# Patient Record
Sex: MALE | URBAN - METROPOLITAN AREA
[De-identification: names, ages, dates, MRNs, and addresses within clinical notes are randomized per-mention and may not be internally consistent; named-entity substitution may affect disease eponyms.]

---

## 2018-08-21 ENCOUNTER — APPOINTMENT (RX ONLY)
Dept: URBAN - METROPOLITAN AREA CLINIC 77 | Facility: CLINIC | Age: 23
Setting detail: DERMATOLOGY
End: 2018-08-21

## 2018-08-21 DIAGNOSIS — B36.0 PITYRIASIS VERSICOLOR: ICD-10-CM

## 2018-08-21 DIAGNOSIS — L70.0 ACNE VULGARIS: ICD-10-CM

## 2018-08-21 DIAGNOSIS — L70.2 ACNE VARIOLIFORMIS: ICD-10-CM

## 2018-08-21 DIAGNOSIS — A63.0 ANOGENITAL (VENEREAL) WARTS: ICD-10-CM

## 2018-08-21 PROCEDURE — ? TREATMENT REGIMEN

## 2018-08-21 PROCEDURE — 17110 DESTRUCTION B9 LES UP TO 14: CPT

## 2018-08-21 PROCEDURE — ? COUNSELING

## 2018-08-21 PROCEDURE — ? PRESCRIPTION

## 2018-08-21 PROCEDURE — ? LIQUID NITROGEN

## 2018-08-21 PROCEDURE — 99203 OFFICE O/P NEW LOW 30 MIN: CPT | Mod: 25

## 2018-08-21 RX ORDER — KETOCONAZOLE 20.5 MG/ML
SHAMPOO, SUSPENSION TOPICAL BIW
Qty: 1 | Refills: 3 | Status: ERX | COMMUNITY
Start: 2018-08-21

## 2018-08-21 RX ORDER — TAZAROTENE 1 MG/G
AEROSOL, FOAM TOPICAL
Qty: 1 | Refills: 2 | Status: ERX | COMMUNITY
Start: 2018-08-21

## 2018-08-21 RX ORDER — MINOCYCLINE HYDROCHLORIDE 105 MG/1
TABLET, FILM COATED, EXTENDED RELEASE ORAL
Qty: 30 | Refills: 2 | Status: ERX | COMMUNITY
Start: 2018-08-21

## 2018-08-21 RX ORDER — SULFACETAMIDE SODIUM, SULFUR 100; 20 MG/G; MG/G
EMULSION TOPICAL
Qty: 1 | Refills: 2 | Status: ERX | COMMUNITY
Start: 2018-08-21

## 2018-08-21 RX ORDER — CLOBETASOL PROPIONATE 0.05 G/100ML
SHAMPOO TOPICAL
Qty: 1 | Refills: 2 | Status: ERX | COMMUNITY
Start: 2018-08-21

## 2018-08-21 RX ADMIN — CLOBETASOL PROPIONATE: 0.05 SHAMPOO TOPICAL at 00:00

## 2018-08-21 RX ADMIN — TAZAROTENE: 1 AEROSOL, FOAM TOPICAL at 00:00

## 2018-08-21 RX ADMIN — MINOCYCLINE HYDROCHLORIDE: 105 TABLET, FILM COATED, EXTENDED RELEASE ORAL at 00:00

## 2018-08-21 RX ADMIN — SULFACETAMIDE SODIUM, SULFUR: 100; 20 EMULSION TOPICAL at 00:00

## 2018-08-21 RX ADMIN — KETOCONAZOLE: 20.5 SHAMPOO, SUSPENSION TOPICAL at 00:00

## 2018-08-21 ASSESSMENT — LOCATION SIMPLE DESCRIPTION DERM
LOCATION SIMPLE: LEFT CHEEK
LOCATION SIMPLE: POSTERIOR SCALP
LOCATION SIMPLE: SCALP
LOCATION SIMPLE: UPPER BACK
LOCATION SIMPLE: NOSE
LOCATION SIMPLE: INFERIOR FOREHEAD
LOCATION SIMPLE: RIGHT CHEEK
LOCATION SIMPLE: PENIS
LOCATION SIMPLE: CHIN
LOCATION SIMPLE: CHEST

## 2018-08-21 ASSESSMENT — LOCATION DETAILED DESCRIPTION DERM
LOCATION DETAILED: INFERIOR MID FOREHEAD
LOCATION DETAILED: RIGHT DORSAL SHAFT OF PENIS
LOCATION DETAILED: LEFT DORSAL SHAFT OF PENIS
LOCATION DETAILED: RIGHT CENTRAL MALAR CHEEK
LOCATION DETAILED: LEFT CENTRAL MALAR CHEEK
LOCATION DETAILED: RIGHT INFERIOR CENTRAL MALAR CHEEK
LOCATION DETAILED: NASAL DORSUM
LOCATION DETAILED: SUPERIOR THORACIC SPINE
LOCATION DETAILED: POSTERIOR MID-PARIETAL SCALP
LOCATION DETAILED: RIGHT CHIN
LOCATION DETAILED: LEFT INFERIOR CENTRAL MALAR CHEEK
LOCATION DETAILED: LEFT SUPERIOR PARIETAL SCALP
LOCATION DETAILED: MIDDLE STERNUM

## 2018-08-21 ASSESSMENT — LOCATION ZONE DERM
LOCATION ZONE: FACE
LOCATION ZONE: SCALP
LOCATION ZONE: NOSE
LOCATION ZONE: TRUNK
LOCATION ZONE: PENIS

## 2018-08-21 NOTE — PROCEDURE: TREATMENT REGIMEN
Plan: Location: face\\nPrescribe: Fabior 0.1% foam (AAA qhs)\\More LS 10%-2% topical cleanser-- Apply in the morning and night to the face when it is clean and dry. Let medication sit for 3-5 minutes before rinsing off.\\n\\nPatient presents with occasional acne breakouts that include comedones and inflamed papules\\Candy the past, he’s used Epiduo every other day without any issues\\nPatient states that when using the Epiduo, the benzoyl peroxide component would bleach his hair \\nDiscussed with patient that this is an immune mediated condition triggered with stress, lack of sleep, and also has a major genetic component\\nIf the patient tries and fails the given treatment plan, can start pt on Accutane at next visit\\nEducated patient on Accutane 6 month treatment course, side effects, and iPledge program/requirements\\nCommon side effects from therapy: dryness, joint pain, mood changes, headaches, nose bleeds\\nDiscussed with patient that Accutane is a Vitamin A derivative\\nDiscussed with patient that Accutane obliterates oil glands and a cure for acne vs. antibiotics which is a temporary treatment\\nDiscussed with patient that medication is processed by the liver and requires blood work to monitor liver functions\\n\\nInstead of using the Epiduo topical, will switch him to Fabior foam every other night and Avar LS 10%-2% topical cleanser (3-5 minutes) to help relieve inflammation.
Detail Level: Zone
Plan: Location: scalp\\nPrescribe: Solodyn 105mg P.O. qd x 30 days prn, Clobetasol 0.05% shampoo\\nPatient presents with inflamed papules and mild scaling on the scalp that he finds irritating and bothersome\\nDiscussed with him that this is a form of acne called acne necrotica\\nEducated him that it is an inflammatory condition, triggered with stress, lack of sleep, and also has a genetic component\\nWill have him start taking Solodyn whenever his acne flares up\\Candy addition, will have him use Clobetasol shampoo to alleviate inflammation
Plan: Location: Chest, back\\nPrescribe: Ketaconazole 2% shampoo (AAA) qd x 30 days\\n\\nDiscussed with pt that white scaly hyperpigmented patches are from an overcolonization of yeast that naturally lives on our body. \\nWill start pt on Ketoconazole oral medication to take for 3 days, advise to exercise one hour afterwards to sweat out infection.\\nWE DISCUSSED THAT THIS IS A CHRONIC ISSUE---WILL TEND TO HAPPEN IN THE FUTURE WHEN  PT IS STRESSED OUT AND HAS A LOT SWEAT.\\nTo prevent this we will give pt Ketaconazole shampoo to use weekly as a prophylactic to prevent recurrence. \\nF/u as needed
Plan: Location: genital region \\nTreatment: LN2-2x\\n\\nPatient has a history of genital warts and states he had the HPV vaccination before\\nDiscussed with patient that they are a common HPV viral infection that occurs during stress and immune system is unable to fight the virus.\\nWill treat with cryotherapy today to allow immune system to fight off virus. \\nDiscussed with pt if wart blister ups, then can use sterile needle to poke and let fluid drain.\\nDiscussed with pt that they should leave skin attached to help provide a protective barrier while it is healing.\\nDiscussed with patient that if this continues to be a recurring issue, will have him consider Gardasil vaccination

## 2018-08-21 NOTE — PROCEDURE: COUNSELING
Detail Level: Zone
Topical Retinoid Pregnancy And Lactation Text: This medication is Pregnancy Category C. It is unknown if this medication is excreted in breast milk.
High Dose Vitamin A Pregnancy And Lactation Text: High dose vitamin A therapy is contraindicated during pregnancy and breast feeding.
Birth Control Pills Pregnancy And Lactation Text: This medication should be avoided if pregnant and for the first 30 days post-partum.
Minocycline Counseling: Patient advised regarding possible photosensitivity and discoloration of the teeth, skin, lips, tongue and gums.  Patient instructed to avoid sunlight, if possible.  When exposed to sunlight, patients should wear protective clothing, sunglasses, and sunscreen.  The patient was instructed to call the office immediately if the following severe adverse effects occur:  hearing changes, easy bruising/bleeding, severe headache, or vision changes.  The patient verbalized understanding of the proper use and possible adverse effects of minocycline.  All of the patient's questions and concerns were addressed.
Dapsone Counseling: I discussed with the patient the risks of dapsone including but not limited to hemolytic anemia, agranulocytosis, rashes, methemoglobinemia, kidney failure, peripheral neuropathy, headaches, GI upset, and liver toxicity.  Patients who start dapsone require monitoring including baseline LFTs and weekly CBCs for the first month, then every month thereafter.  The patient verbalized understanding of the proper use and possible adverse effects of dapsone.  All of the patient's questions and concerns were addressed.
Spironolactone Counseling: Patient advised regarding risks of diarrhea, abdominal pain, hyperkalemia, birth defects (for female patients), liver toxicity and renal toxicity. The patient may need blood work to monitor liver and kidney function and potassium levels while on therapy. The patient verbalized understanding of the proper use and possible adverse effects of spironolactone.  All of the patient's questions and concerns were addressed.
Erythromycin Counseling:  I discussed with the patient the risks of erythromycin including but not limited to GI upset, allergic reaction, drug rash, diarrhea, increase in liver enzymes, and yeast infections.
Bactrim Pregnancy And Lactation Text: This medication is Pregnancy Category D and is known to cause fetal risk.  It is also excreted in breast milk.
Azithromycin Pregnancy And Lactation Text: This medication is considered safe during pregnancy and is also secreted in breast milk.
Benzoyl Peroxide Counseling: Patient counseled that medicine may cause skin irritation and bleach clothing.  In the event of skin irritation, the patient was advised to reduce the amount of the drug applied or use it less frequently.   The patient verbalized understanding of the proper use and possible adverse effects of benzoyl peroxide.  All of the patient's questions and concerns were addressed.
Dapsone Pregnancy And Lactation Text: This medication is Pregnancy Category C and is not considered safe during pregnancy or breast feeding.
Minocycline Pregnancy And Lactation Text: This medication is Pregnancy Category D and not consider safe during pregnancy. It is also excreted in breast milk.
Azithromycin Counseling:  I discussed with the patient the risks of azithromycin including but not limited to GI upset, allergic reaction, drug rash, diarrhea, and yeast infections.
Topical Retinoid counseling:  Patient advised to apply a pea-sized amount only at bedtime and wait 30 minutes after washing their face before applying.  If too drying, patient may add a non-comedogenic moisturizer. The patient verbalized understanding of the proper use and possible adverse effects of retinoids.  All of the patient's questions and concerns were addressed.
High Dose Vitamin A Counseling: Side effects reviewed, pt to contact office should one occur.
Isotretinoin Pregnancy And Lactation Text: This medication is Pregnancy Category X and is considered extremely dangerous during pregnancy. It is unknown if it is excreted in breast milk.
Birth Control Pills Counseling: Birth Control Pill Counseling: I discussed with the patient the potential side effects of OCPs including but not limited to increased risk of stroke, heart attack, thrombophlebitis, deep venous thrombosis, hepatic adenomas, breast changes, GI upset, headaches, and depression.  The patient verbalized understanding of the proper use and possible adverse effects of OCPs. All of the patient's questions and concerns were addressed.
Topical Clindamycin Pregnancy And Lactation Text: This medication is Pregnancy Category B and is considered safe during pregnancy. It is unknown if it is excreted in breast milk.
Tetracycline Counseling: Patient counseled regarding possible photosensitivity and increased risk for sunburn.  Patient instructed to avoid sunlight, if possible.  When exposed to sunlight, patients should wear protective clothing, sunglasses, and sunscreen.  The patient was instructed to call the office immediately if the following severe adverse effects occur:  hearing changes, easy bruising/bleeding, severe headache, or vision changes.  The patient verbalized understanding of the proper use and possible adverse effects of tetracycline.  All of the patient's questions and concerns were addressed. Patient understands to avoid pregnancy while on therapy due to potential birth defects.
Doxycycline Counseling:  Patient counseled regarding possible photosensitivity and increased risk for sunburn.  Patient instructed to avoid sunlight, if possible.  When exposed to sunlight, patients should wear protective clothing, sunglasses, and sunscreen.  The patient was instructed to call the office immediately if the following severe adverse effects occur:  hearing changes, easy bruising/bleeding, severe headache, or vision changes.  The patient verbalized understanding of the proper use and possible adverse effects of doxycycline.  All of the patient's questions and concerns were addressed.
Topical Clindamycin Counseling: Patient counseled that this medication may cause skin irritation or allergic reactions.  In the event of skin irritation, the patient was advised to reduce the amount of the drug applied or use it less frequently.   The patient verbalized understanding of the proper use and possible adverse effects of clindamycin.  All of the patient's questions and concerns were addressed.
Isotretinoin Counseling: Patient should get monthly blood tests, not donate blood, not drive at night if vision affected, not share medication, and not undergo elective surgery for 6 months after tx completed. Side effects reviewed, pt to contact office should one occur.
Benzoyl Peroxide Pregnancy And Lactation Text: This medication is Pregnancy Category C. It is unknown if benzoyl peroxide is excreted in breast milk.
Tazorac Pregnancy And Lactation Text: This medication is not safe during pregnancy. It is unknown if this medication is excreted in breast milk.
Erythromycin Pregnancy And Lactation Text: This medication is Pregnancy Category B and is considered safe during pregnancy. It is also excreted in breast milk.
Doxycycline Pregnancy And Lactation Text: This medication is Pregnancy Category D and not consider safe during pregnancy. It is also excreted in breast milk but is considered safe for shorter treatment courses.
Topical Sulfur Applications Pregnancy And Lactation Text: This medication is Pregnancy Category C and has an unknown safety profile during pregnancy. It is unknown if this topical medication is excreted in breast milk.
Topical Sulfur Applications Counseling: Topical Sulfur Counseling: Patient counseled that this medication may cause skin irritation or allergic reactions.  In the event of skin irritation, the patient was advised to reduce the amount of the drug applied or use it less frequently.   The patient verbalized understanding of the proper use and possible adverse effects of topical sulfur application.  All of the patient's questions and concerns were addressed.
Bactrim Counseling:  I discussed with the patient the risks of sulfa antibiotics including but not limited to GI upset, allergic reaction, drug rash, diarrhea, dizziness, photosensitivity, and yeast infections.  Rarely, more serious reactions can occur including but not limited to aplastic anemia, agranulocytosis, methemoglobinemia, blood dyscrasias, liver or kidney failure, lung infiltrates or desquamative/blistering drug rashes.
Tazorac Counseling:  Patient advised that medication is irritating and drying.  Patient may need to apply sparingly and wash off after an hour before eventually leaving it on overnight.  The patient verbalized understanding of the proper use and possible adverse effects of tazorac.  All of the patient's questions and concerns were addressed.
Spironolactone Pregnancy And Lactation Text: This medication can cause feminization of the male fetus and should be avoided during pregnancy. The active metabolite is also found in breast milk.

## 2018-08-21 NOTE — PROCEDURE: LIQUID NITROGEN
Render Post-Care Instructions In Note?: no
Consent: The patient's consent was obtained including but not limited to risks of crusting, scabbing, blistering, scarring, darker or lighter pigmentary change, recurrence, incomplete removal and infection.
Detail Level: Zone
Number Of Freeze-Thaw Cycles: 3 freeze-thaw cycles
Medical Necessity Clause: This procedure was medically necessary because the lesions that were treated were:
Medical Necessity Information: It is in your best interest to select a reason for this procedure from the list below. All of these items fulfill various CMS LCD requirements except the new and changing color options.
Post-Care Instructions: I reviewed with the patient in detail post-care instructions. Patient is to wear sunprotection, and avoid picking at any of the treated lesions. Pt may apply Vaseline to crusted or scabbing areas.

## 2018-10-08 ENCOUNTER — APPOINTMENT (RX ONLY)
Dept: URBAN - METROPOLITAN AREA CLINIC 77 | Facility: CLINIC | Age: 23
Setting detail: DERMATOLOGY
End: 2018-10-08

## 2018-10-08 DIAGNOSIS — B36.0 PITYRIASIS VERSICOLOR: ICD-10-CM

## 2018-10-08 DIAGNOSIS — L70.2 ACNE VARIOLIFORMIS: ICD-10-CM

## 2018-10-08 DIAGNOSIS — L70.0 ACNE VULGARIS: ICD-10-CM

## 2018-10-08 DIAGNOSIS — A63.0 ANOGENITAL (VENEREAL) WARTS: ICD-10-CM

## 2018-10-08 PROCEDURE — ? LIQUID NITROGEN

## 2018-10-08 PROCEDURE — 99213 OFFICE O/P EST LOW 20 MIN: CPT | Mod: 25

## 2018-10-08 PROCEDURE — ? TREATMENT REGIMEN

## 2018-10-08 PROCEDURE — 17110 DESTRUCTION B9 LES UP TO 14: CPT

## 2018-10-08 PROCEDURE — ? COUNSELING

## 2018-10-08 ASSESSMENT — LOCATION ZONE DERM
LOCATION ZONE: FACE
LOCATION ZONE: PENIS
LOCATION ZONE: TRUNK
LOCATION ZONE: NOSE
LOCATION ZONE: SCALP

## 2018-10-08 ASSESSMENT — LOCATION DETAILED DESCRIPTION DERM
LOCATION DETAILED: RIGHT CHIN
LOCATION DETAILED: RIGHT DORSAL SHAFT OF PENIS
LOCATION DETAILED: MIDDLE STERNUM
LOCATION DETAILED: RIGHT CENTRAL MALAR CHEEK
LOCATION DETAILED: SUPERIOR THORACIC SPINE
LOCATION DETAILED: RIGHT INFERIOR CENTRAL MALAR CHEEK
LOCATION DETAILED: LEFT INFERIOR CENTRAL MALAR CHEEK
LOCATION DETAILED: INFERIOR MID FOREHEAD
LOCATION DETAILED: LEFT SUPERIOR PARIETAL SCALP
LOCATION DETAILED: PERIUMBILICAL SKIN
LOCATION DETAILED: POSTERIOR MID-PARIETAL SCALP
LOCATION DETAILED: NASAL DORSUM
LOCATION DETAILED: LEFT DORSAL SHAFT OF PENIS
LOCATION DETAILED: LEFT CENTRAL MALAR CHEEK

## 2018-10-08 ASSESSMENT — LOCATION SIMPLE DESCRIPTION DERM
LOCATION SIMPLE: NOSE
LOCATION SIMPLE: CHEST
LOCATION SIMPLE: SCALP
LOCATION SIMPLE: RIGHT CHEEK
LOCATION SIMPLE: LEFT CHEEK
LOCATION SIMPLE: UPPER BACK
LOCATION SIMPLE: POSTERIOR SCALP
LOCATION SIMPLE: INFERIOR FOREHEAD
LOCATION SIMPLE: CHIN
LOCATION SIMPLE: PENIS
LOCATION SIMPLE: ABDOMEN

## 2018-10-08 NOTE — PROCEDURE: LIQUID NITROGEN
Medical Necessity Clause: This procedure was medically necessary because the lesions that were treated were:
Include Z78.9 (Other Specified Conditions Influencing Health Status) As An Associated Diagnosis?: No
Medical Necessity Information: It is in your best interest to select a reason for this procedure from the list below. All of these items fulfill various CMS LCD requirements except the new and changing color options.
Consent: The patient's consent was obtained including but not limited to risks of crusting, scabbing, blistering, scarring, darker or lighter pigmentary change, recurrence, incomplete removal and infection.
Number Of Freeze-Thaw Cycles: 3 freeze-thaw cycles
Post-Care Instructions: I reviewed with the patient in detail post-care instructions. Patient is to wear sunprotection, and avoid picking at any of the treated lesions. Pt may apply Vaseline to crusted or scabbing areas.
Detail Level: Zone

## 2018-10-08 NOTE — PROCEDURE: TREATMENT REGIMEN
Detail Level: Zone
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 Went over side effects to be aware of. If his acne does not improve with his regimen, then Accutane would be ideal\\nEducated patient on Accutane 6 month treatment course, side effects, and iPledge program/requirements\\nCommon side effects from therapy: dryness, joint pain, mood changes, headaches, nose bleeds\\nDiscussed with patient that Accutane is a Vitamin A derivative\\nDiscussed with patient that Accutane obliterates oil glands and a cure for acne vs. antibiotics which is a temporary treatment\\nDiscussed with patient that medication is processed by the liver and requires blood work to monitor liver functions\\nBut since his parents (physicians) do not think Accutane therapy is an ideal treatment, patient is not considering this option right now.
Plan: Location: scalp\\nContinue treatment: Solodyn 105mg P.O. qd x 30 days prn, Clobetasol 0.05% shampoo\\nPatient is here for a follow up after taking Solodyn as needed and using clobetasol shampoo. \\nHe no longer presents with inflamed papules and mild scaling on the scalp that he found irritating and bothersome\\nWill have him continue taking Solodyn whenever his acne flares up and use Clobetasol shampoo to alleviate inflammation
Plan: Location: Chest, back\\nContinue treatment: Ketaconazole 2% shampoo (AAA) qd x 30 days\\n\\nPatient is here for a follow up after using Ketoconazole shampoo.\\nThere is still a pinkish pigmentation present and patient finds it concerning\\nReassured him that because he had the yeast infection for a long period of time, the pigment will take a while to return to its normal state\\nIf patient would like a biopsy done to rule out vitiligo vs. tinea versicolor can do so, however I am certain that this is tinea versicolor \\nPatient will continue using the shampoo and allow the pigment to return
Plan: Location: genital region \\nToday’s Treatment: LN2-2x\\n\\nPt is here for a follow up after having cryotherapy treatment\\nInformed him that there are a few verrucous papules present that require further treatment \\nPatient has a history of genital warts and states he had the HPV vaccination before\\nDiscussed with patient that they are a common HPV viral infection that occurs during stress and immune system is unable to fight the virus.\\nWill treat with cryotherapy today to allow immune system to fight off virus. \\nDiscussed with pt if wart blister ups, then can use sterile needle to poke and let fluid drain.\\nDiscussed with pt that they should leave skin attached to help provide a protective barrier while it is healing.\\nDiscussed with patient that if this continues to be a recurring issue, will have him consider Gardasil vaccination

## 2018-10-08 NOTE — PROCEDURE: COUNSELING
Erythromycin Counseling:  I discussed with the patient the risks of erythromycin including but not limited to GI upset, allergic reaction, drug rash, diarrhea, increase in liver enzymes, and yeast infections.
Topical Sulfur Applications Pregnancy And Lactation Text: This medication is Pregnancy Category C and has an unknown safety profile during pregnancy. It is unknown if this topical medication is excreted in breast milk.
Azithromycin Pregnancy And Lactation Text: This medication is considered safe during pregnancy and is also secreted in breast milk.
Isotretinoin Counseling: Patient should get monthly blood tests, not donate blood, not drive at night if vision affected, not share medication, and not undergo elective surgery for 6 months after tx completed. Side effects reviewed, pt to contact office should one occur.
Topical Retinoid Pregnancy And Lactation Text: This medication is Pregnancy Category C. It is unknown if this medication is excreted in breast milk.
Spironolactone Counseling: Patient advised regarding risks of diarrhea, abdominal pain, hyperkalemia, birth defects (for female patients), liver toxicity and renal toxicity. The patient may need blood work to monitor liver and kidney function and potassium levels while on therapy. The patient verbalized understanding of the proper use and possible adverse effects of spironolactone.  All of the patient's questions and concerns were addressed.
Tazorac Counseling:  Patient advised that medication is irritating and drying.  Patient may need to apply sparingly and wash off after an hour before eventually leaving it on overnight.  The patient verbalized understanding of the proper use and possible adverse effects of tazorac.  All of the patient's questions and concerns were addressed.
Minocycline Counseling: Patient advised regarding possible photosensitivity and discoloration of the teeth, skin, lips, tongue and gums.  Patient instructed to avoid sunlight, if possible.  When exposed to sunlight, patients should wear protective clothing, sunglasses, and sunscreen.  The patient was instructed to call the office immediately if the following severe adverse effects occur:  hearing changes, easy bruising/bleeding, severe headache, or vision changes.  The patient verbalized understanding of the proper use and possible adverse effects of minocycline.  All of the patient's questions and concerns were addressed.
High Dose Vitamin A Pregnancy And Lactation Text: High dose vitamin A therapy is contraindicated during pregnancy and breast feeding.
Detail Level: Zone
Isotretinoin Pregnancy And Lactation Text: This medication is Pregnancy Category X and is considered extremely dangerous during pregnancy. It is unknown if it is excreted in breast milk.
Topical Sulfur Applications Counseling: Topical Sulfur Counseling: Patient counseled that this medication may cause skin irritation or allergic reactions.  In the event of skin irritation, the patient was advised to reduce the amount of the drug applied or use it less frequently.   The patient verbalized understanding of the proper use and possible adverse effects of topical sulfur application.  All of the patient's questions and concerns were addressed.
Birth Control Pills Counseling: Birth Control Pill Counseling: I discussed with the patient the potential side effects of OCPs including but not limited to increased risk of stroke, heart attack, thrombophlebitis, deep venous thrombosis, hepatic adenomas, breast changes, GI upset, headaches, and depression.  The patient verbalized understanding of the proper use and possible adverse effects of OCPs. All of the patient's questions and concerns were addressed.
Azithromycin Counseling:  I discussed with the patient the risks of azithromycin including but not limited to GI upset, allergic reaction, drug rash, diarrhea, and yeast infections.
Doxycycline Pregnancy And Lactation Text: This medication is Pregnancy Category D and not consider safe during pregnancy. It is also excreted in breast milk but is considered safe for shorter treatment courses.
Tazorac Pregnancy And Lactation Text: This medication is not safe during pregnancy. It is unknown if this medication is excreted in breast milk.
Topical Clindamycin Pregnancy And Lactation Text: This medication is Pregnancy Category B and is considered safe during pregnancy. It is unknown if it is excreted in breast milk.
Tetracycline Counseling: Patient counseled regarding possible photosensitivity and increased risk for sunburn.  Patient instructed to avoid sunlight, if possible.  When exposed to sunlight, patients should wear protective clothing, sunglasses, and sunscreen.  The patient was instructed to call the office immediately if the following severe adverse effects occur:  hearing changes, easy bruising/bleeding, severe headache, or vision changes.  The patient verbalized understanding of the proper use and possible adverse effects of tetracycline.  All of the patient's questions and concerns were addressed. Patient understands to avoid pregnancy while on therapy due to potential birth defects.
Benzoyl Peroxide Counseling: Patient counseled that medicine may cause skin irritation and bleach clothing.  In the event of skin irritation, the patient was advised to reduce the amount of the drug applied or use it less frequently.   The patient verbalized understanding of the proper use and possible adverse effects of benzoyl peroxide.  All of the patient's questions and concerns were addressed.
Topical Retinoid counseling:  Patient advised to apply a pea-sized amount only at bedtime and wait 30 minutes after washing their face before applying.  If too drying, patient may add a non-comedogenic moisturizer. The patient verbalized understanding of the proper use and possible adverse effects of retinoids.  All of the patient's questions and concerns were addressed.
Benzoyl Peroxide Pregnancy And Lactation Text: This medication is Pregnancy Category C. It is unknown if benzoyl peroxide is excreted in breast milk.
Doxycycline Counseling:  Patient counseled regarding possible photosensitivity and increased risk for sunburn.  Patient instructed to avoid sunlight, if possible.  When exposed to sunlight, patients should wear protective clothing, sunglasses, and sunscreen.  The patient was instructed to call the office immediately if the following severe adverse effects occur:  hearing changes, easy bruising/bleeding, severe headache, or vision changes.  The patient verbalized understanding of the proper use and possible adverse effects of doxycycline.  All of the patient's questions and concerns were addressed.
Minocycline Pregnancy And Lactation Text: This medication is Pregnancy Category D and not consider safe during pregnancy. It is also excreted in breast milk.
Bactrim Pregnancy And Lactation Text: This medication is Pregnancy Category D and is known to cause fetal risk.  It is also excreted in breast milk.
Birth Control Pills Pregnancy And Lactation Text: This medication should be avoided if pregnant and for the first 30 days post-partum.
Dapsone Counseling: I discussed with the patient the risks of dapsone including but not limited to hemolytic anemia, agranulocytosis, rashes, methemoglobinemia, kidney failure, peripheral neuropathy, headaches, GI upset, and liver toxicity.  Patients who start dapsone require monitoring including baseline LFTs and weekly CBCs for the first month, then every month thereafter.  The patient verbalized understanding of the proper use and possible adverse effects of dapsone.  All of the patient's questions and concerns were addressed.
Spironolactone Pregnancy And Lactation Text: This medication can cause feminization of the male fetus and should be avoided during pregnancy. The active metabolite is also found in breast milk.
Bactrim Counseling:  I discussed with the patient the risks of sulfa antibiotics including but not limited to GI upset, allergic reaction, drug rash, diarrhea, dizziness, photosensitivity, and yeast infections.  Rarely, more serious reactions can occur including but not limited to aplastic anemia, agranulocytosis, methemoglobinemia, blood dyscrasias, liver or kidney failure, lung infiltrates or desquamative/blistering drug rashes.
Erythromycin Pregnancy And Lactation Text: This medication is Pregnancy Category B and is considered safe during pregnancy. It is also excreted in breast milk.
Topical Clindamycin Counseling: Patient counseled that this medication may cause skin irritation or allergic reactions.  In the event of skin irritation, the patient was advised to reduce the amount of the drug applied or use it less frequently.   The patient verbalized understanding of the proper use and possible adverse effects of clindamycin.  All of the patient's questions and concerns were addressed.
High Dose Vitamin A Counseling: Side effects reviewed, pt to contact office should one occur.
Dapsone Pregnancy And Lactation Text: This medication is Pregnancy Category C and is not considered safe during pregnancy or breast feeding.

## 2018-11-26 ENCOUNTER — APPOINTMENT (RX ONLY)
Dept: URBAN - METROPOLITAN AREA CLINIC 77 | Facility: CLINIC | Age: 23
Setting detail: DERMATOLOGY
End: 2018-11-26

## 2018-11-26 DIAGNOSIS — B36.0 PITYRIASIS VERSICOLOR: ICD-10-CM | Status: IMPROVED

## 2018-11-26 DIAGNOSIS — L70.0 ACNE VULGARIS: ICD-10-CM

## 2018-11-26 DIAGNOSIS — Z79.899 OTHER LONG TERM (CURRENT) DRUG THERAPY: ICD-10-CM

## 2018-11-26 DIAGNOSIS — L85.3 XEROSIS CUTIS: ICD-10-CM

## 2018-11-26 DIAGNOSIS — A63.0 ANOGENITAL (VENEREAL) WARTS: ICD-10-CM

## 2018-11-26 PROCEDURE — ? COUNSELING

## 2018-11-26 PROCEDURE — 99214 OFFICE O/P EST MOD 30 MIN: CPT | Mod: 25

## 2018-11-26 PROCEDURE — ? PRESCRIPTION

## 2018-11-26 PROCEDURE — ? TREATMENT REGIMEN

## 2018-11-26 PROCEDURE — ? HIGH RISK MEDICATION MONITORING

## 2018-11-26 PROCEDURE — ? ORDER TESTS

## 2018-11-26 PROCEDURE — ? LIQUID NITROGEN

## 2018-11-26 RX ORDER — SINECATECHINS 150 MG/G
OINTMENT TOPICAL
Qty: 1 | Refills: 2 | Status: ERX | COMMUNITY
Start: 2018-11-26

## 2018-11-26 RX ORDER — ISOTRETINOIN 40 MG/1
CAPSULE ORAL
Qty: 30 | Refills: 0 | Status: ERX | COMMUNITY
Start: 2018-11-26

## 2018-11-26 RX ADMIN — SINECATECHINS: 150 OINTMENT TOPICAL at 00:00

## 2018-11-26 RX ADMIN — ISOTRETINOIN: 40 CAPSULE ORAL at 00:00

## 2018-11-26 ASSESSMENT — LOCATION SIMPLE DESCRIPTION DERM
LOCATION SIMPLE: GROIN
LOCATION SIMPLE: PENIS
LOCATION SIMPLE: UPPER BACK
LOCATION SIMPLE: ABDOMEN
LOCATION SIMPLE: CHEST

## 2018-11-26 ASSESSMENT — LOCATION DETAILED DESCRIPTION DERM
LOCATION DETAILED: SUPERIOR THORACIC SPINE
LOCATION DETAILED: SUPRAPUBIC SKIN
LOCATION DETAILED: RIGHT DORSAL SHAFT OF PENIS
LOCATION DETAILED: PERIUMBILICAL SKIN
LOCATION DETAILED: MIDDLE STERNUM
LOCATION DETAILED: LEFT DORSAL SHAFT OF PENIS

## 2018-11-26 ASSESSMENT — LOCATION ZONE DERM
LOCATION ZONE: TRUNK
LOCATION ZONE: PENIS

## 2018-11-26 NOTE — PROCEDURE: TREATMENT REGIMEN
Detail Level: Zone
Plan: Location: Chest, back\\nContinue treatment: Ketaconazole 2% shampoo (AAA) qd x 30 days\\n\\nPatient is here for a follow up after using Ketoconazole shampoo. Pt states that the rash is resolved. \\nDiscussed with the t that will continue him on Ketaconazole 2% shampoo on as needed basis\\nReassured him that because he had the yeast infection for a long period of time, the pigment will take a while to return to its normal state\\nIf patient would like a biopsy done to rule out vitiligo vs. tinea versicolor can do so, however I am certain that this is tinea versicolor \\nPatient will continue using the shampoo and allow the pigment to return
Plan: Location: genital region \\nToday’s Treatment: LN2 -3 lesions, Veregen ointment \\n\\nPt is here for a follow up after having cryotherapy treatment. P states that the ones we treated last time resolved but he developed a new ones. \\nDiscussed with the pt that will treat with LN2 today. \\nDiscussed with patient that they are a common HPV viral infection that occurs during stress and immune system is unable to fight the virus.\\nWill treat with cryotherapy today to allow immune system to fight off virus. \\nDiscussed with pt if wart blister ups, then can use sterile needle to poke and let fluid drain.\\nDiscussed with pt that they should leave skin attached to help provide a protective barrier while it is healing.\\nDiscussed with patient that if this continues to be a recurring issue, will have him consider Gardasil vaccination
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 Pt states that his acne not any better. \\nPt states that he wants to start Accutane. \\n\\nPatient is ready to start Accutane therapy after failing Solodyn, Avar wash, Fabior foam. \\nHe continues to have erythematous inflamed papules and comedonal acne that he finds very bothersome and embarrassing.\\nDiscussed with him that we will start him at 40mg dose, reiterated the usage of Cerave MC and ointment during therapy to help with dryness\\nDiscussed with pt that blood work is needed ASAP and before he come in next month to make sure his body is processing medication well.\\nDiscussed with pt side effects to be aware of: headaches, joint pain, dryness, nose bleeds, mood changes.\\nDuring therapy, avoid donating blood while on treatment.\\nUse sunscreen with zinc oxide to prevent sun damage and block UV light.\\nDiscussed with pt that we will prescribe Absorica 40mg po qd x 30 days.\\nDiscussed with pt to take with a fatty meal.\\n\\nIPLEDGE program and consent form discussed with patient. The side effects and warnings for the use of Accutane were discussed with the patient. He understands he can not donate blood for 1 month post tx and can not have laser tx, cosmetic surgery, waxing or peels for 6 months post treatment.\\nDiscussed musculoskeletal SE’s in detail. Discussed HA’s, dry skin, and depression in detail. Patient denies depression. Denies personal or family H/O Crohns, IBS, IBD, or bloody stools.\\nUnderstand the need to fill Rx in 6 days and have monthly labs and OV.\\n\\nF/u in 1 month

## 2018-11-26 NOTE — PROCEDURE: ORDER TESTS
Billing Type: Third-Party Bill
Lab Facility: [id number]
Expected Date Of Service: 11/26/2018
Bill For Surgical Tray: no
Performing Laboratory: [id number]

## 2018-11-26 NOTE — PROCEDURE: LIQUID NITROGEN
Render Post-Care Instructions In Note?: no
Number Of Freeze-Thaw Cycles: 3 freeze-thaw cycles
Medical Necessity Clause: This procedure was medically necessary because the lesions that were treated were:
Medical Necessity Information: It is in your best interest to select a reason for this procedure from the list below. All of these items fulfill various CMS LCD requirements except the new and changing color options.
Detail Level: Zone
Consent: The patient's consent was obtained including but not limited to risks of crusting, scabbing, blistering, scarring, darker or lighter pigmentary change, recurrence, incomplete removal and infection.
Post-Care Instructions: I reviewed with the patient in detail post-care instructions. Patient is to wear sunprotection, and avoid picking at any of the treated lesions. Pt may apply Vaseline to crusted or scabbing areas.

## 2019-02-04 ENCOUNTER — APPOINTMENT (RX ONLY)
Dept: URBAN - METROPOLITAN AREA CLINIC 77 | Facility: CLINIC | Age: 24
Setting detail: DERMATOLOGY
End: 2019-02-04

## 2019-02-04 DIAGNOSIS — A63.0 ANOGENITAL (VENEREAL) WARTS: ICD-10-CM

## 2019-02-04 DIAGNOSIS — L85.3 XEROSIS CUTIS: ICD-10-CM

## 2019-02-04 DIAGNOSIS — B36.0 PITYRIASIS VERSICOLOR: ICD-10-CM

## 2019-02-04 DIAGNOSIS — Z79.899 OTHER LONG TERM (CURRENT) DRUG THERAPY: ICD-10-CM

## 2019-02-04 DIAGNOSIS — L70.0 ACNE VULGARIS: ICD-10-CM

## 2019-02-04 PROCEDURE — ? ORDER TESTS

## 2019-02-04 PROCEDURE — ? TREATMENT REGIMEN

## 2019-02-04 PROCEDURE — ? PRESCRIPTION

## 2019-02-04 PROCEDURE — ? LIQUID NITROGEN

## 2019-02-04 PROCEDURE — ? COUNSELING

## 2019-02-04 PROCEDURE — ? HIGH RISK MEDICATION MONITORING

## 2019-02-04 PROCEDURE — 99214 OFFICE O/P EST MOD 30 MIN: CPT | Mod: 25

## 2019-02-04 RX ORDER — ISOTRETINOIN 40 MG/1
CAPSULE ORAL
Qty: 60 | Refills: 0 | Status: ERX

## 2019-02-04 ASSESSMENT — LOCATION DETAILED DESCRIPTION DERM
LOCATION DETAILED: RIGHT DORSAL SHAFT OF PENIS
LOCATION DETAILED: LEFT DORSAL SHAFT OF PENIS
LOCATION DETAILED: RIGHT ANTERIOR SCROTUM

## 2019-02-04 ASSESSMENT — LOCATION SIMPLE DESCRIPTION DERM
LOCATION SIMPLE: SCROTUM
LOCATION SIMPLE: PENIS

## 2019-02-04 ASSESSMENT — LOCATION ZONE DERM
LOCATION ZONE: GENITALIA
LOCATION ZONE: PENIS

## 2019-02-04 NOTE — PROCEDURE: ORDER TESTS
Billing Type: Third-Party Bill
Lab Facility: [id number]
Expected Date Of Service: 11/26/2018
Bill For Surgical Tray: no
Performing Laboratory: [id number]

## 2019-02-04 NOTE — PROCEDURE: LIQUID NITROGEN
Number Of Freeze-Thaw Cycles: 3 freeze-thaw cycles
Add 52 Modifier (Optional): no
Medical Necessity Clause: This procedure was medically necessary because the lesions that were treated were:
Detail Level: Zone
Post-Care Instructions: I reviewed with the patient in detail post-care instructions. Patient is to wear sunprotection, and avoid picking at any of the treated lesions. Pt may apply Vaseline to crusted or scabbing areas.
Consent: The patient's consent was obtained including but not limited to risks of crusting, scabbing, blistering, scarring, darker or lighter pigmentary change, recurrence, incomplete removal and infection.
Medical Necessity Information: It is in your best interest to select a reason for this procedure from the list below. All of these items fulfill various CMS LCD requirements except the new and changing color options.

## 2019-02-04 NOTE — PROCEDURE: TREATMENT REGIMEN
Plan: Location: genital region \\nToday’s Treatment: LN2 -3 lesions, continue Veregen ointment \\n\\nPt is here for a follow up after having cryotherapy treatment. \\nPt discussed that he has been using Vergen daily and area is getting irritated.\\n\\nPt states that the ones we treated last time resolved but he developed a new ones. \\nDiscussed with the pt that will treat with LN2 today. \\nDiscussed with patient that they are a common HPV viral infection that occurs during stress and immune system is unable to fight the virus.\\nWill treat with cryotherapy today to allow immune system to fight off virus. \\nDiscussed with pt if wart blister ups, then can use sterile needle to poke and let fluid drain.\\nDiscussed with pt that they should leave skin attached to help provide a protective barrier while it is healing.\\nDiscussed with patient that if this continues to be a recurring issue, will have him consider Gardasil vaccination\\nDiscussed with pt to continue Vergen (2-3 times a week)
Detail Level: Zone
Plan: Location: face\\nDuration: Finished 1st, going on 2nd\\nPrescribed: Absorica 80mg PO QD x 30 days\\nPharmacy: [facility]\\niPledge: [phone]\\n\\nPt is herer for 1 month f/u.\\nPt statest that he is taking Absorica 40mg po qd.\\nPt discussed that he has no side effects from medication only dryness.\\nDiscussed with pt that his labs look normal today.\\nDiscussed with pt that we will increase his dosage to 80mg daily.\\nDiscussed with pt what to expect from increased dosage.\\nWill prescribe 80mg po qd x 30 days\\nPatient is ready to start Accutane therapy after failing Solodyn, Avar wash, Fabior foam. \\nHe continues to have erythematous inflamed papules and comedonal acne that he finds very bothersome and embarrassing.\\nDiscussed with him that we will start him at 40mg dose, reiterated the usage of Cerave MC and ointment during therapy to help with dryness\\nDiscussed with pt that blood work is needed ASAP and before he come in next month to make sure his body is processing medication well.\\nDiscussed with pt side effects to be aware of: headaches, joint pain, dryness, nose bleeds, mood changes.\\nDuring therapy, avoid donating blood while on treatment.\\nUse sunscreen with zinc oxide to prevent sun damage and block UV light.\\nDiscussed with pt that we will prescribe Absorica 40mg po qd x 30 days.\\nDiscussed with pt to take with a fatty meal.\\n\\nIPLEDGE program and consent form discussed with patient. The side effects and warnings for the use of Accutane were discussed with the patient. He understands he can not donate blood for 1 month post tx and can not have laser tx, cosmetic surgery, waxing or peels for 6 months post treatment.\\nDiscussed musculoskeletal SE’s in detail. Discussed HA’s, dry skin, and depression in detail. Patient denies depression. Denies personal or family H/O Crohns, IBS, IBD, or bloody stools.\\nUnderstand the need to fill Rx in 6 days and have monthly labs and OV.\\n\\nF/u in 1 month

## 2019-03-04 ENCOUNTER — APPOINTMENT (RX ONLY)
Dept: URBAN - METROPOLITAN AREA CLINIC 77 | Facility: CLINIC | Age: 24
Setting detail: DERMATOLOGY
End: 2019-03-04

## 2019-03-04 DIAGNOSIS — A63.0 ANOGENITAL (VENEREAL) WARTS: ICD-10-CM

## 2019-03-04 DIAGNOSIS — Z79.899 OTHER LONG TERM (CURRENT) DRUG THERAPY: ICD-10-CM

## 2019-03-04 DIAGNOSIS — L70.0 ACNE VULGARIS: ICD-10-CM

## 2019-03-04 DIAGNOSIS — L85.3 XEROSIS CUTIS: ICD-10-CM

## 2019-03-04 PROCEDURE — ? TREATMENT REGIMEN

## 2019-03-04 PROCEDURE — ? LIQUID NITROGEN

## 2019-03-04 PROCEDURE — ? HIGH RISK MEDICATION MONITORING

## 2019-03-04 PROCEDURE — 99214 OFFICE O/P EST MOD 30 MIN: CPT | Mod: 25

## 2019-03-04 PROCEDURE — ? COUNSELING

## 2019-03-04 PROCEDURE — ? PRESCRIPTION

## 2019-03-04 RX ORDER — ISOTRETINOIN 40 MG/1
CAPSULE ORAL
Qty: 60 | Refills: 0 | Status: ERX

## 2019-03-04 ASSESSMENT — LOCATION SIMPLE DESCRIPTION DERM: LOCATION SIMPLE: PENIS

## 2019-03-04 ASSESSMENT — LOCATION DETAILED DESCRIPTION DERM
LOCATION DETAILED: RIGHT DORSAL SHAFT OF PENIS
LOCATION DETAILED: LEFT DORSAL SHAFT OF PENIS

## 2019-03-04 ASSESSMENT — LOCATION ZONE DERM: LOCATION ZONE: PENIS

## 2019-03-04 NOTE — PROCEDURE: TREATMENT REGIMEN
Detail Level: Zone
Plan: Location: genital region \\nToday’s Treatment: LN2 -3 lesions, continue Veregen ointment \\n\\nPt is here for a follow up after having cryotherapy treatment. \\nPt discussed that he has been using Vergen daily and area is getting irritated.\\nEnlarge follicles can be \\n\\nPt states that the ones we treated last time resolved but he developed a new ones. \\nDiscussed with the pt that will treat with LN2 today. \\nDiscussed with patient that they are a common HPV viral infection that occurs during stress and immune system is unable to fight the virus.\\nWill treat with cryotherapy today to allow immune system to fight off virus. \\nDiscussed with pt if wart blister ups, then can use sterile needle to poke and let fluid drain.\\nDiscussed with pt that they should leave skin attached to help provide a protective barrier while it is healing.\\nDiscussed with patient that if this continues to be a recurring issue, will have him consider Gardasil vaccination\\nDiscussed with pt to continue Vergen (2-3 times a week)
Plan: Location: face\\nDuration: Finished 2nd month going on 3rd month \\nPrescribed: Absorica 80mg PO QD x 30 days\\nPharmacy: [facility]\\niPledge: [phone]\\nFailed medication: Solodyn, Avar wash, Fabior foam. \\n\\nPatient is her for a one month follow up \\nPatient has completed 2 months of Accutane treatment \\nPatient complains of no side effects other than dryness \\nPatient states he’s been experiencing nose bleeds as of recently, I recommended that patient get a over the counter lubricant nasal spray to help prevent future nose bleeds \\nHe continues to have erythematous inflamed papules and comedonal acne that he finds very bothersome and embarrassing.\\nReiterated the usage of Cerave MC and ointment during therapy to help with dryness\\nDiscussed with pt that blood work is needed ASAP and before he come in next month to make sure his body is processing medication well.\\n\\nDiscussed with pt side effects to be aware of: headaches, joint pain, dryness, nose bleeds, mood changes.\\nDuring therapy, avoid donating blood while on treatment.\\nUse sunscreen with zinc oxide to prevent sun damage and block UV light.\\nDiscussed with pt that we will prescribe Absorica 40mg po qd x 30 days.\\nDiscussed with pt to take with a fatty meal.\\nExtraction of the penis was done for a black head \\n\\nIPLEDGE program and consent form discussed with patient. The side effects and warnings for the use of Accutane were discussed with the patient. He understands he can not donate blood for 1 month post tx and can not have laser tx, cosmetic surgery, waxing or peels for 6 months post treatment.\\nDiscussed musculoskeletal SE’s in detail. Discussed HA’s, dry skin, and depression in detail. Patient denies depression. Denies personal or family H/O Crohns, IBS, IBD, or bloody stools.\\nUnderstand the need to fill Rx in 6 days and have monthly labs and OV.\\n\\nF/u in 1 month
Plan: Location: tops of both hands \\n\\nPatient advised to only use soap on pits and privates due to Accutane causing dryness

## 2019-03-04 NOTE — PROCEDURE: LIQUID NITROGEN
Detail Level: Zone
Post-Care Instructions: I reviewed with the patient in detail post-care instructions. Patient is to wear sunprotection, and avoid picking at any of the treated lesions. Pt may apply Vaseline to crusted or scabbing areas.
Render Post-Care Instructions In Note?: no
Consent: The patient's consent was obtained including but not limited to risks of crusting, scabbing, blistering, scarring, darker or lighter pigmentary change, recurrence, incomplete removal and infection.
Medical Necessity Clause: This procedure was medically necessary because the lesions that were treated were:
Number Of Freeze-Thaw Cycles: 3 freeze-thaw cycles
Medical Necessity Information: It is in your best interest to select a reason for this procedure from the list below. All of these items fulfill various CMS LCD requirements except the new and changing color options.

## 2019-04-08 ENCOUNTER — APPOINTMENT (RX ONLY)
Dept: URBAN - METROPOLITAN AREA CLINIC 77 | Facility: CLINIC | Age: 24
Setting detail: DERMATOLOGY
End: 2019-04-08

## 2019-04-08 DIAGNOSIS — A63.0 ANOGENITAL (VENEREAL) WARTS: ICD-10-CM

## 2019-04-08 DIAGNOSIS — Z79.899 OTHER LONG TERM (CURRENT) DRUG THERAPY: ICD-10-CM

## 2019-04-08 DIAGNOSIS — L70.0 ACNE VULGARIS: ICD-10-CM

## 2019-04-08 DIAGNOSIS — L85.3 XEROSIS CUTIS: ICD-10-CM

## 2019-04-08 PROCEDURE — ? COUNSELING

## 2019-04-08 PROCEDURE — ? LIQUID NITROGEN

## 2019-04-08 PROCEDURE — ? TREATMENT REGIMEN

## 2019-04-08 PROCEDURE — 99214 OFFICE O/P EST MOD 30 MIN: CPT | Mod: 25

## 2019-04-08 PROCEDURE — ? PRESCRIPTION

## 2019-04-08 PROCEDURE — ? HIGH RISK MEDICATION MONITORING

## 2019-04-08 RX ORDER — ISOTRETINOIN 40 MG/1
CAPSULE ORAL
Qty: 60 | Refills: 0 | Status: ERX

## 2019-04-08 ASSESSMENT — LOCATION SIMPLE DESCRIPTION DERM
LOCATION SIMPLE: GROIN
LOCATION SIMPLE: PENIS

## 2019-04-08 ASSESSMENT — LOCATION ZONE DERM
LOCATION ZONE: TRUNK
LOCATION ZONE: PENIS

## 2019-04-08 ASSESSMENT — LOCATION DETAILED DESCRIPTION DERM
LOCATION DETAILED: RIGHT DORSAL SHAFT OF PENIS
LOCATION DETAILED: LEFT DORSAL SHAFT OF PENIS
LOCATION DETAILED: SUPRAPUBIC SKIN

## 2019-04-08 NOTE — PROCEDURE: TREATMENT REGIMEN
Detail Level: Zone
Plan: Location: Face \\nDuration: Finished 3rd month going on to 4th \\nPrescribed: Absorica 80mg PO QD x 30 days\\nPharmacy: [facility]\\niPledge: [phone]\\n\\nFailed medication: Solodyn, Avar wash, Fabior foam. \\n\\nPatient is here for a one month follow up \\nPatient has completed 3 months of Accutane treatment \\nPatient complains of no side effects other than dryness and some soreness \\nHe states moisturizing his skin with Aveeno moisturizing cream along with CeraVe moisturizing cream \\n\\nDiscussed with pt side effects to be aware of: headaches, joint pain, dryness, nose bleeds, mood changes.\\nDuring therapy, avoid donating blood while on treatment.\\nUse sunscreen with zinc oxide to prevent sun damage and block UV light.\\nDiscussed with patient as he competes treatment soreness will resolve \\nToday I will be sending a prescription for Absorica 80mg for him to take with a fatty meal \\n\\nIPLEDGE program and consent form discussed with patient. The side effects and warnings for the use of Accutane were discussed with the patient. He understands he can not donate blood for 1 month post tx and can not have laser tx, cosmetic surgery, waxing or peels for 6 months post treatment.\\nDiscussed musculoskeletal SE’s in detail. Discussed HA’s, dry skin, and depression in detail. Patient denies depression. Denies personal or family H/O Crohns, IBS, IBD, or bloody stools.\\nUnderstand the need to fill Rx in 6 days \\n\\nF/u in 1 month
Plan: Location: tops of both hands \\n\\nPatient advised to only use soap on pits and privates due to Accutane causing dryness
Plan: Location: genital region \\nUsing: Veregen Ointment \\nToday’s Treatment: LN2\\n\\nPatient is here for a follow up \\nPatient is using Veregen Ointment daily with some improvement, however he has noticed topical causing irritation \\nPatient also had LN2 performed last office visit \\n\\nDiscussed with patient that they are a common HPV viral infection that occurs during stress and immune system is unable to fight the virus.\\nToday I will treat with cryotherapy today to allow immune system to fight off virus. \\nDiscussed with pt if wart blister ups, then can use sterile needle to poke and let fluid drain.\\nDiscussed with pt that they should leave skin attached to help provide a protective barrier while it is healing.\\n\\nPLAN: \\nToday I will treat active warts with LN2, 3-4 lesions \\nI instructed patient to discontinue veregen Ointment for 4 weeks, until he follows up further instructions will be provided at that time \\nToday I will provide patient with CeraVe cream mixed with Cidofovir fo rhim to apply once a day 3 times weekly (PATIENT WILL  FROM OFFICE AFTER WORK)  \\n\\nFollow up: 4 weeks

## 2019-04-08 NOTE — PROCEDURE: LIQUID NITROGEN
Include Z78.9 (Other Specified Conditions Influencing Health Status) As An Associated Diagnosis?: No
Medical Necessity Clause: This procedure was medically necessary because the lesions that were treated were:
Post-Care Instructions: I reviewed with the patient in detail post-care instructions. Patient is to wear sunprotection, and avoid picking at any of the treated lesions. Pt may apply Vaseline to crusted or scabbing areas.
Number Of Freeze-Thaw Cycles: 3 freeze-thaw cycles
Detail Level: Detailed
Consent: The patient's consent was obtained including but not limited to risks of crusting, scabbing, blistering, scarring, darker or lighter pigmentary change, recurrence, incomplete removal and infection.
Medical Necessity Information: It is in your best interest to select a reason for this procedure from the list below. All of these items fulfill various CMS LCD requirements except the new and changing color options.

## 2019-04-23 ENCOUNTER — APPOINTMENT (RX ONLY)
Dept: URBAN - METROPOLITAN AREA CLINIC 77 | Facility: CLINIC | Age: 24
Setting detail: DERMATOLOGY
End: 2019-04-23

## 2019-04-23 DIAGNOSIS — L63.8 OTHER ALOPECIA AREATA: ICD-10-CM

## 2019-04-23 PROCEDURE — 99213 OFFICE O/P EST LOW 20 MIN: CPT | Mod: 25

## 2019-04-23 PROCEDURE — 11900 INJECT SKIN LESIONS </W 7: CPT

## 2019-04-23 PROCEDURE — ? TREATMENT REGIMEN

## 2019-04-23 PROCEDURE — ? INTRALESIONAL KENALOG

## 2019-04-23 PROCEDURE — ? COUNSELING

## 2019-04-23 ASSESSMENT — LOCATION SIMPLE DESCRIPTION DERM: LOCATION SIMPLE: SCALP

## 2019-04-23 ASSESSMENT — LOCATION DETAILED DESCRIPTION DERM
LOCATION DETAILED: LEFT CENTRAL OCCIPITAL SCALP
LOCATION DETAILED: LEFT CENTRAL PARIETAL SCALP

## 2019-04-23 ASSESSMENT — LOCATION ZONE DERM: LOCATION ZONE: SCALP

## 2019-04-23 NOTE — PROCEDURE: INTRALESIONAL KENALOG
Medical Necessity Clause: This procedure was medically necessary because the lesions that were treated were:
Expiration Date (Optional): 6/2020
Kenalog Preparation: Kenalog
Concentration Of Solution Injected (Mg/Ml): 5.0
Detail Level: Detailed
X Size Of Lesion In Cm (Optional): 0
Consent: The risks of atrophy were reviewed with the patient.
Include Z78.9 (Other Specified Conditions Influencing Health Status) As An Associated Diagnosis?: No
Administered By (Optional): Dr. Florian
Lot # (Optional): [id number]
Total Volume Injected (Ccs- Only Use Numbers And Decimals): 1.0
Treatment Number (Optional): 1

## 2019-05-14 ENCOUNTER — APPOINTMENT (RX ONLY)
Dept: URBAN - METROPOLITAN AREA CLINIC 77 | Facility: CLINIC | Age: 24
Setting detail: DERMATOLOGY
End: 2019-05-14

## 2019-05-14 DIAGNOSIS — Q828 OTHER SPECIFIED ANOMALIES OF SKIN: ICD-10-CM

## 2019-05-14 DIAGNOSIS — Q819 OTHER SPECIFIED ANOMALIES OF SKIN: ICD-10-CM

## 2019-05-14 DIAGNOSIS — L85.3 XEROSIS CUTIS: ICD-10-CM

## 2019-05-14 DIAGNOSIS — A63.0 ANOGENITAL (VENEREAL) WARTS: ICD-10-CM

## 2019-05-14 DIAGNOSIS — L63.8 OTHER ALOPECIA AREATA: ICD-10-CM

## 2019-05-14 DIAGNOSIS — Q826 OTHER SPECIFIED ANOMALIES OF SKIN: ICD-10-CM

## 2019-05-14 DIAGNOSIS — L70.0 ACNE VULGARIS: ICD-10-CM

## 2019-05-14 DIAGNOSIS — Z79.899 OTHER LONG TERM (CURRENT) DRUG THERAPY: ICD-10-CM

## 2019-05-14 PROBLEM — Q82.8 OTHER SPECIFIED CONGENITAL MALFORMATIONS OF SKIN: Status: ACTIVE | Noted: 2019-05-14

## 2019-05-14 PROCEDURE — ? PRESCRIPTION

## 2019-05-14 PROCEDURE — 99213 OFFICE O/P EST LOW 20 MIN: CPT | Mod: 25

## 2019-05-14 PROCEDURE — 17110 DESTRUCTION B9 LES UP TO 14: CPT

## 2019-05-14 PROCEDURE — ? INTRALESIONAL KENALOG

## 2019-05-14 PROCEDURE — ? LIQUID NITROGEN

## 2019-05-14 PROCEDURE — ? COUNSELING

## 2019-05-14 PROCEDURE — ? TREATMENT REGIMEN

## 2019-05-14 PROCEDURE — ? HIGH RISK MEDICATION MONITORING

## 2019-05-14 RX ORDER — HYDROCORTISONE ACETATE, IODOQUINOL 19; 10 MG/G; MG/G
CREAM TOPICAL
Qty: 30 | Refills: 1 | Status: ERX | COMMUNITY
Start: 2019-05-14

## 2019-05-14 RX ADMIN — HYDROCORTISONE ACETATE, IODOQUINOL: 19; 10 CREAM TOPICAL at 00:00

## 2019-05-14 ASSESSMENT — LOCATION SIMPLE DESCRIPTION DERM
LOCATION SIMPLE: SCALP
LOCATION SIMPLE: LEFT UPPER ARM
LOCATION SIMPLE: RIGHT UPPER ARM
LOCATION SIMPLE: POSTERIOR SCALP
LOCATION SIMPLE: PENIS

## 2019-05-14 ASSESSMENT — LOCATION DETAILED DESCRIPTION DERM
LOCATION DETAILED: LEFT DORSAL SHAFT OF PENIS
LOCATION DETAILED: LEFT PROXIMAL POSTERIOR UPPER ARM
LOCATION DETAILED: RIGHT PROXIMAL POSTERIOR UPPER ARM
LOCATION DETAILED: RIGHT DORSAL SHAFT OF PENIS
LOCATION DETAILED: POSTERIOR MID-PARIETAL SCALP
LOCATION DETAILED: LEFT SUPERIOR PARIETAL SCALP

## 2019-05-14 ASSESSMENT — LOCATION ZONE DERM
LOCATION ZONE: PENIS
LOCATION ZONE: ARM
LOCATION ZONE: SCALP

## 2019-05-14 NOTE — PROCEDURE: INTRALESIONAL KENALOG
Medical Necessity Clause: This procedure was medically necessary because the lesions that were treated were:
Detail Level: Zone
Treatment Number (Optional): 2
X Size Of Lesion In Cm (Optional): 0
Consent: The risks of atrophy were reviewed with the patient.
Concentration Of Solution Injected (Mg/Ml): 20.0
Kenalog Preparation: Kenalog with 5-fluorouracil
Include Z78.9 (Other Specified Conditions Influencing Health Status) As An Associated Diagnosis?: No

## 2019-05-14 NOTE — PROCEDURE: LIQUID NITROGEN
Include Z78.9 (Other Specified Conditions Influencing Health Status) As An Associated Diagnosis?: No
Post-Care Instructions: I reviewed with the patient in detail post-care instructions. Patient is to wear sunprotection, and avoid picking at any of the treated lesions. Pt may apply Vaseline to crusted or scabbing areas.
Consent: The patient's consent was obtained including but not limited to risks of crusting, scabbing, blistering, scarring, darker or lighter pigmentary change, recurrence, incomplete removal and infection.
Medical Necessity Clause: This procedure was medically necessary because the lesions that were treated were:
Medical Necessity Information: It is in your best interest to select a reason for this procedure from the list below. All of these items fulfill various CMS LCD requirements except the new and changing color options.
Detail Level: Detailed
Number Of Freeze-Thaw Cycles: 3 freeze-thaw cycles

## 2019-05-14 NOTE — PROCEDURE: TREATMENT REGIMEN
Detail Level: Zone
Plan: Location: Face \\nDuration: Finished 3 months—-WILL TAKE A BREAK SINCE HE DEVELOPED ALOPECIA AREATA\\nPharmacy: [facility]\\niPledge: [phone]\\nFailed medication: Solodyn, Avar wash, Fabior foam. \\n\\nPatient is here for a one month follow up \\nHe developed alopecia areata during Accutane therapy and found it very concerning \\nHe saw Dr. Florian and received 5FU/k40 injections and was advised to start Rogaine \\nI reassured patient that hairloss is most likely not derived from Accutane, however it is best to take a break from Accutane until his alopecia is resolved.\\nToday will inject with 5FU/K40 and follow up in one month \\n\\n\\nIPLEDGE program and consent form discussed with patient. The side effects and warnings for the use of Accutane were discussed with the patient. He understands he can not donate blood for 1 month post tx and can not have laser tx, cosmetic surgery, waxing or peels for 6 months post treatment.\\nDiscussed musculoskeletal SE’s in detail. Discussed HA’s, dry skin, and depression in detail. Patient denies depression. Denies personal or family H/O Crohns, IBS, IBD, or bloody stools.\\nUnderstand the need to fill Rx in 6 days \\n\\nF/u in 1 month
Plan: Location: tops of both hands \\n\\nPatient advised to only use soap on pits and privates due to Accutane causing dryness
Plan: Location: Posterior scalp\\nTreatment: injected with 1cc of 5FU/ILK 40\\n\\nPt is here for hair loss on scalp that he developed while on Accutane \\nPt discussed that his hair loss was sudden and is very concerned.\\nToday pt has circular bald patches throughout his scalp.\\nPictures taken.\\nHe saw Dr. Florian and received 5FU/K40 injections at site \\n\\nDiscussed with patient this is also an auto-immune condition usually genetic, onset by increased stress and a lowered immune system.\\nAdvised patient to that we can treatment with 5FU/ILK 40 to help relieve inflammation.\\nDiscussed with pt to start using Rogaine to help produce hair growth from the external as well---discussed hair may fall out for the first few weeks. \\nAlso advised patient to try and keep stress under control in order to prevent further breakouts of hair loss.\\nF/u in one month
Plan: Location: bilateral upper arms\\nPrescribe:  Vytone cream with aloe and hydrocortisone \\n\\nDiscussed with pt that this a common benign skin condition, where his skin produces spiny, rough patches, usually on upper arms.\\nDiscussed with pt that we will prescribe Vytone cream to help soothe and alleviate inflammation \\nDiscussed with pt to use a MC such as Cerave afterward to help reestablish a healthy skin barrier.\\n\\nF/u as needed.
Plan: Location: genital region \\nUsing: Veregen Ointment \\nToday’s Treatment: LN2\\n\\nPatient is here for a follow up and would like to make sure no new lesions developed\\nThere is one lesion visible today that I will treat with LN2\\n\\nDiscussed with patient that they are a common HPV viral infection that occurs during stress and immune system is unable to fight the virus.\\nToday I will treat with cryotherapy today to allow immune system to fight off virus. \\nDiscussed with pt if wart blister ups, then can use sterile needle to poke and let fluid drain.\\nDiscussed with pt that they should leave skin attached to help provide a protective barrier while it is healing.\\n\\nPatient is considering the Gardasil vaccination, a series of 4 injections. Provided him a referral to a [facility] group.

## 2019-06-14 ENCOUNTER — APPOINTMENT (RX ONLY)
Dept: URBAN - METROPOLITAN AREA CLINIC 77 | Facility: CLINIC | Age: 24
Setting detail: DERMATOLOGY
End: 2019-06-14

## 2019-06-14 DIAGNOSIS — Q828 OTHER SPECIFIED ANOMALIES OF SKIN: ICD-10-CM

## 2019-06-14 DIAGNOSIS — A63.0 ANOGENITAL (VENEREAL) WARTS: ICD-10-CM

## 2019-06-14 DIAGNOSIS — L63.8 OTHER ALOPECIA AREATA: ICD-10-CM

## 2019-06-14 DIAGNOSIS — Q826 OTHER SPECIFIED ANOMALIES OF SKIN: ICD-10-CM

## 2019-06-14 DIAGNOSIS — L70.0 ACNE VULGARIS: ICD-10-CM

## 2019-06-14 DIAGNOSIS — Q819 OTHER SPECIFIED ANOMALIES OF SKIN: ICD-10-CM

## 2019-06-14 DIAGNOSIS — Z79.899 OTHER LONG TERM (CURRENT) DRUG THERAPY: ICD-10-CM

## 2019-06-14 PROBLEM — Q82.8 OTHER SPECIFIED CONGENITAL MALFORMATIONS OF SKIN: Status: ACTIVE | Noted: 2019-06-14

## 2019-06-14 PROCEDURE — ? COUNSELING

## 2019-06-14 PROCEDURE — ? HIGH RISK MEDICATION MONITORING

## 2019-06-14 PROCEDURE — 17110 DESTRUCTION B9 LES UP TO 14: CPT

## 2019-06-14 PROCEDURE — ? LIQUID NITROGEN

## 2019-06-14 PROCEDURE — 99213 OFFICE O/P EST LOW 20 MIN: CPT | Mod: 25

## 2019-06-14 PROCEDURE — ? TREATMENT REGIMEN

## 2019-06-14 PROCEDURE — ? INTRALESIONAL KENALOG

## 2019-06-14 ASSESSMENT — LOCATION ZONE DERM
LOCATION ZONE: ARM
LOCATION ZONE: SCALP
LOCATION ZONE: PENIS

## 2019-06-14 ASSESSMENT — LOCATION SIMPLE DESCRIPTION DERM
LOCATION SIMPLE: RIGHT UPPER ARM
LOCATION SIMPLE: PENIS
LOCATION SIMPLE: POSTERIOR SCALP
LOCATION SIMPLE: SCALP
LOCATION SIMPLE: LEFT UPPER ARM

## 2019-06-14 ASSESSMENT — LOCATION DETAILED DESCRIPTION DERM
LOCATION DETAILED: LEFT SUPERIOR PARIETAL SCALP
LOCATION DETAILED: LEFT PROXIMAL POSTERIOR UPPER ARM
LOCATION DETAILED: POSTERIOR MID-PARIETAL SCALP
LOCATION DETAILED: LEFT DORSAL SHAFT OF PENIS
LOCATION DETAILED: RIGHT DORSAL SHAFT OF PENIS
LOCATION DETAILED: RIGHT PROXIMAL POSTERIOR UPPER ARM

## 2019-06-14 NOTE — PROCEDURE: INTRALESIONAL KENALOG
Kenalog Preparation: Kenalog with 5-fluorouracil
Treatment Number (Optional): 15
Consent: The risks of atrophy were reviewed with the patient.
Detail Level: Zone
Include Z78.9 (Other Specified Conditions Influencing Health Status) As An Associated Diagnosis?: No
Concentration Of Solution Injected (Mg/Ml): 20.0
Total Volume Injected (Ccs- Only Use Numbers And Decimals): 2
Medical Necessity Clause: This procedure was medically necessary because the lesions that were treated were:
X Size Of Lesion In Cm (Optional): 0

## 2019-06-14 NOTE — PROCEDURE: LIQUID NITROGEN
Detail Level: Detailed
Consent: The patient's consent was obtained including but not limited to risks of crusting, scabbing, blistering, scarring, darker or lighter pigmentary change, recurrence, incomplete removal and infection.
Medical Necessity Information: It is in your best interest to select a reason for this procedure from the list below. All of these items fulfill various CMS LCD requirements except the new and changing color options.
Render Post-Care Instructions In Note?: no
Post-Care Instructions: I reviewed with the patient in detail post-care instructions. Patient is to wear sunprotection, and avoid picking at any of the treated lesions. Pt may apply Vaseline to crusted or scabbing areas.
Number Of Freeze-Thaw Cycles: 3 freeze-thaw cycles
Medical Necessity Clause: This procedure was medically necessary because the lesions that were treated were:

## 2019-07-16 ENCOUNTER — APPOINTMENT (RX ONLY)
Dept: URBAN - METROPOLITAN AREA CLINIC 77 | Facility: CLINIC | Age: 24
Setting detail: DERMATOLOGY
End: 2019-07-16

## 2019-07-16 DIAGNOSIS — Q819 OTHER SPECIFIED ANOMALIES OF SKIN: ICD-10-CM

## 2019-07-16 DIAGNOSIS — L63.8 OTHER ALOPECIA AREATA: ICD-10-CM

## 2019-07-16 DIAGNOSIS — Q826 OTHER SPECIFIED ANOMALIES OF SKIN: ICD-10-CM

## 2019-07-16 DIAGNOSIS — L85.3 XEROSIS CUTIS: ICD-10-CM

## 2019-07-16 DIAGNOSIS — Q828 OTHER SPECIFIED ANOMALIES OF SKIN: ICD-10-CM

## 2019-07-16 DIAGNOSIS — A63.0 ANOGENITAL (VENEREAL) WARTS: ICD-10-CM

## 2019-07-16 PROBLEM — Q82.8 OTHER SPECIFIED CONGENITAL MALFORMATIONS OF SKIN: Status: ACTIVE | Noted: 2019-07-16

## 2019-07-16 PROCEDURE — ? BENIGN DESTRUCTION

## 2019-07-16 PROCEDURE — 17110 DESTRUCTION B9 LES UP TO 14: CPT

## 2019-07-16 PROCEDURE — ? INTRALESIONAL KENALOG

## 2019-07-16 PROCEDURE — ? LIQUID NITROGEN

## 2019-07-16 PROCEDURE — ? PRESCRIPTION

## 2019-07-16 PROCEDURE — ? TREATMENT REGIMEN

## 2019-07-16 PROCEDURE — ? COUNSELING

## 2019-07-16 PROCEDURE — 99213 OFFICE O/P EST LOW 20 MIN: CPT | Mod: 25

## 2019-07-16 ASSESSMENT — LOCATION ZONE DERM
LOCATION ZONE: SCALP
LOCATION ZONE: PENIS
LOCATION ZONE: ARM

## 2019-07-16 ASSESSMENT — LOCATION SIMPLE DESCRIPTION DERM
LOCATION SIMPLE: SCALP
LOCATION SIMPLE: POSTERIOR SCALP
LOCATION SIMPLE: RIGHT UPPER ARM
LOCATION SIMPLE: PENIS
LOCATION SIMPLE: LEFT UPPER ARM

## 2019-07-16 ASSESSMENT — LOCATION DETAILED DESCRIPTION DERM
LOCATION DETAILED: LEFT PROXIMAL POSTERIOR UPPER ARM
LOCATION DETAILED: LEFT SUPERIOR PARIETAL SCALP
LOCATION DETAILED: RIGHT DORSAL SHAFT OF PENIS
LOCATION DETAILED: POSTERIOR MID-PARIETAL SCALP
LOCATION DETAILED: LEFT DORSAL SHAFT OF PENIS
LOCATION DETAILED: RIGHT PROXIMAL POSTERIOR UPPER ARM

## 2019-07-16 NOTE — PROCEDURE: LIQUID NITROGEN
Add 52 Modifier (Optional): no
Medical Necessity Information: It is in your best interest to select a reason for this procedure from the list below. All of these items fulfill various CMS LCD requirements except the new and changing color options.
Consent: The patient's consent was obtained including but not limited to risks of crusting, scabbing, blistering, scarring, darker or lighter pigmentary change, recurrence, incomplete removal and infection.
Post-Care Instructions: I reviewed with the patient in detail post-care instructions. Patient is to wear sunprotection, and avoid picking at any of the treated lesions. Pt may apply Vaseline to crusted or scabbing areas.
Detail Level: Detailed
Number Of Freeze-Thaw Cycles: 3 freeze-thaw cycles
Medical Necessity Clause: This procedure was medically necessary because the lesions that were treated were:

## 2019-07-16 NOTE — PROCEDURE: TREATMENT REGIMEN
Detail Level: Zone
Plan: Location: B/L Arms, Chest, Back \\nPharmacy: [facility]\\nPrescription: Lidocaine numbing cream \\n\\nTreatment: IPL: 515, 15j, 10ms, 60% cooling\\nPhoto taken \\n\\nPatient presents with rough patches and rough-like bumps on his arms, chest and back \\nPatient states bumps appeared when he started Accuatne treatment to treat his acne \\nPatient is currently off Accutane, until arms are better \\n\\nDiscussed with patient that the laser/IPL device emits a range of lightwaves that are then tuned and targeted red bumps \\n--- As IPL was being performed, patient was complaining of laser being to painful \\nDiscussed with patient for laser to cause a burn on the ( upper shoulders) due to how thin skin is \\n--- Patient was advised to apply CeraVe healing ointment and ice to help with burning sensation \\nA numbing cream will be sent to the pharmacy ([facility]) for patient to apply before he comes in for his next IPL, he was also provided with email address --- [email or web address] to send progress pictures \\n\\nFollow up: 1 month for IPL
Plan: Location: Genital region  \\nTreatment: LN2 TODAY \\n\\nPatient is here for a follow up\\nPatient wants to be sure there are no more warts \\n\\nDiscussed with patient that they are a common HPV viral infection that occurs during stress and immune system is unable to fight the virus.\\n— Today I will treat with cryotherapy today to allow immune system to fight off virus. \\nDiscussed with pt if wart blister ups, then can use sterile needle to poke and let fluid drain.\\nDiscussed with pt that they should leave skin attached to help provide a protective barrier while it is healing.\\n\\nFollow up: 1 month
Plan: Location: Posterior scalp\\nPrevious treatment: 5FU/ILK 40\\nTreatment: 5FU/ILK 40\\n\\n\\nPatient is here for a follow up \\nPatient states since his last office visit he has not noticed much hair growth \\n\\nDiscussed with patient that there are new hair follicles developing where we last injected, however the circular bald patches have not resolved and are still noticeable \\n\\nDiscussed with patient this is also an auto-immune condition usually genetic, onset by increased stress and a lowered immune system.\\nInformed patient that we will continue with 5FU/ILK 40 to help relieve inflammation\\nHe was advised to continue using Rogaine to help produce hair growth from the external as well---discussed hair may fall out for the first few weeks. \\nAlso advised patient to try and keep stress under control in order to prevent further breakouts of hair loss.\\n\\nFollow up: 1 month

## 2019-07-16 NOTE — PROCEDURE: BENIGN DESTRUCTION
Treatment Number (Will Not Render If 0): 0
Include Z78.9 (Other Specified Conditions Influencing Health Status) As An Associated Diagnosis?: No
Consent: The patient's consent was obtained including but not limited to risks of crusting, scabbing, blistering, scarring, darker or lighter pigmentary change, recurrence, incomplete removal and infection.
Anesthesia Volume In Cc: 0.5
Post-Care Instructions: I reviewed with the patient in detail post-care instructions. Patient is to wear sunprotection, and avoid picking at any of the treated lesions. Pt may apply Vaseline to crusted or scabbing areas.
Medical Necessity Clause: This procedure was medically necessary because the lesions that were treated were:
Medical Necessity Information: It is in your best interest to select a reason for this procedure from the list below. All of these items fulfill various CMS LCD requirements except the new and changing color options.
Detail Level: Detailed

## 2019-07-16 NOTE — PROCEDURE: INTRALESIONAL KENALOG
Include Z78.9 (Other Specified Conditions Influencing Health Status) As An Associated Diagnosis?: No
Total Volume Injected (Ccs- Only Use Numbers And Decimals): 2
Kenalog Preparation: Kenalog with 5-fluorouracil
Medical Necessity Clause: This procedure was medically necessary because the lesions that were treated were:
Consent: The risks of atrophy were reviewed with the patient.
Treatment Number (Optional): 15
Concentration Of Solution Injected (Mg/Ml): 20.0
X Size Of Lesion In Cm (Optional): 0
Detail Level: Zone

## 2019-07-18 RX ORDER — PHARMACY COMPOUNDING ACCESSORY
EACH MISCELLANEOUS
Qty: 1 | Refills: 0 | Status: ERX | COMMUNITY
Start: 2019-07-18

## 2019-07-18 RX ADMIN — Medication: at 00:00

## 2019-08-20 ENCOUNTER — APPOINTMENT (RX ONLY)
Dept: URBAN - METROPOLITAN AREA CLINIC 77 | Facility: CLINIC | Age: 24
Setting detail: DERMATOLOGY
End: 2019-08-20

## 2019-08-20 DIAGNOSIS — Q828 OTHER SPECIFIED ANOMALIES OF SKIN: ICD-10-CM

## 2019-08-20 DIAGNOSIS — L63.8 OTHER ALOPECIA AREATA: ICD-10-CM

## 2019-08-20 DIAGNOSIS — L85.3 XEROSIS CUTIS: ICD-10-CM

## 2019-08-20 DIAGNOSIS — Q819 OTHER SPECIFIED ANOMALIES OF SKIN: ICD-10-CM

## 2019-08-20 DIAGNOSIS — A63.0 ANOGENITAL (VENEREAL) WARTS: ICD-10-CM

## 2019-08-20 DIAGNOSIS — Q826 OTHER SPECIFIED ANOMALIES OF SKIN: ICD-10-CM

## 2019-08-20 PROBLEM — Q82.8 OTHER SPECIFIED CONGENITAL MALFORMATIONS OF SKIN: Status: ACTIVE | Noted: 2019-08-20

## 2019-08-20 PROCEDURE — ? LIQUID NITROGEN

## 2019-08-20 PROCEDURE — ? INTRALESIONAL KENALOG

## 2019-08-20 PROCEDURE — ? COUNSELING

## 2019-08-20 PROCEDURE — 99213 OFFICE O/P EST LOW 20 MIN: CPT | Mod: 25

## 2019-08-20 PROCEDURE — ? TREATMENT REGIMEN

## 2019-08-20 PROCEDURE — ? BENIGN DESTRUCTION

## 2019-08-20 ASSESSMENT — LOCATION SIMPLE DESCRIPTION DERM
LOCATION SIMPLE: TRAPEZIAL NECK
LOCATION SIMPLE: RIGHT UPPER ARM
LOCATION SIMPLE: SCALP
LOCATION SIMPLE: LEFT UPPER ARM
LOCATION SIMPLE: PENIS
LOCATION SIMPLE: POSTERIOR SCALP

## 2019-08-20 ASSESSMENT — LOCATION DETAILED DESCRIPTION DERM
LOCATION DETAILED: RIGHT DORSAL SHAFT OF PENIS
LOCATION DETAILED: LEFT PROXIMAL POSTERIOR UPPER ARM
LOCATION DETAILED: POSTERIOR MID-PARIETAL SCALP
LOCATION DETAILED: RIGHT PROXIMAL POSTERIOR UPPER ARM
LOCATION DETAILED: LEFT DORSAL SHAFT OF PENIS
LOCATION DETAILED: LEFT SUPERIOR PARIETAL SCALP
LOCATION DETAILED: MID TRAPEZIAL NECK

## 2019-08-20 ASSESSMENT — LOCATION ZONE DERM
LOCATION ZONE: SCALP
LOCATION ZONE: NECK
LOCATION ZONE: PENIS
LOCATION ZONE: ARM

## 2019-08-20 NOTE — PROCEDURE: BENIGN DESTRUCTION
Anesthesia Volume In Cc: 0.5
Render Note In Bullet Format When Appropriate: No
Medical Necessity Information: It is in your best interest to select a reason for this procedure from the list below. All of these items fulfill various CMS LCD requirements except the new and changing color options.
Medical Necessity Clause: This procedure was medically necessary because the lesions that were treated were:
Post-Care Instructions: I reviewed with the patient in detail post-care instructions. Patient is to wear sunprotection, and avoid picking at any of the treated lesions. Pt may apply Vaseline to crusted or scabbing areas.
Detail Level: Detailed
Treatment Number (Will Not Render If 0): 0
Consent: The patient's consent was obtained including but not limited to risks of crusting, scabbing, blistering, scarring, darker or lighter pigmentary change, recurrence, incomplete removal and infection.

## 2019-08-20 NOTE — PROCEDURE: LIQUID NITROGEN
Medical Necessity Information: It is in your best interest to select a reason for this procedure from the list below. All of these items fulfill various CMS LCD requirements except the new and changing color options.
Post-Care Instructions: I reviewed with the patient in detail post-care instructions. Patient is to wear sunprotection, and avoid picking at any of the treated lesions. Pt may apply Vaseline to crusted or scabbing areas.
Render Post-Care Instructions In Note?: no
Medical Necessity Clause: This procedure was medically necessary because the lesions that were treated were:
Number Of Freeze-Thaw Cycles: 3 freeze-thaw cycles
Detail Level: Detailed
Consent: The patient's consent was obtained including but not limited to risks of crusting, scabbing, blistering, scarring, darker or lighter pigmentary change, recurrence, incomplete removal and infection.

## 2019-08-20 NOTE — PROCEDURE: TREATMENT REGIMEN
Plan: Location: Genital region  \\nTreatment: LN2 TODAY \\n\\nPatient is here for a follow up\\nPatient wants to be sure there are no more warts \\n\\nDiscussed with patient that they are a common HPV viral infection that occurs during stress and immune system is unable to fight the virus.\\n— Today I will treat with cryotherapy today to allow immune system to fight off virus. \\nDiscussed with pt if wart blister ups, then can use sterile needle to poke and let fluid drain.\\nDiscussed with pt that they should leave skin attached to help provide a protective barrier while it is healing.\\n\\nFollow up: 1 month
Detail Level: Zone
Plan: Location: Posterior scalp\\nPrevious treatment: 5FU/ILK 40\\nTreatment: 5FU/ILK 40 & 1.0 Kenalog 40 (straight)\\nPrevious treatment-\\n04/23/2019 1st injection \\n05/14/2019  2nd injection \\n06/14/2019  3rd injection \\n07/16/2019   4th injection \\n\\n08/20/2019 5th injection \\n\\nPatient is here for a one month follow up \\nPatient states that he’s noticed and has felt some new hair growth \\nToday I will continue to inject with 5FU/ILK 40 in addition I will also inject with straight Kenalog 40 to see if this further continues to help with hair growth.\\n\\nDiscussed with patient that there are new hair follicles developing where we last injected, however the circular bald patches have not resolved and are still noticeable \\n\\nDiscussed with patient this is also an auto-immune condition usually genetic, onset by increased stress and a lowered immune system.\\nInformed patient that we will continue with 5FU/ILK 40 to help relieve inflammation\\nHe was advised to continue using Rogaine to help produce hair growth from the external as well---discussed hair may fall out for the first few weeks. \\nAlso advised patient to try and keep stress under control in order to prevent further breakouts of hair loss.\\n\\nFollow up: 1 month
Samples Given: Duemarilynri
Plan: Location: B/L Arms, Chest, Back \\nPharmacy: [facility]\\nPrescription: Lidocaine numbing cream \\n\\nPrevious Treatment: IPL: 515, 15j, 10ms, 60% cooling\\n\\n\\nPatient presents with rough patches and rough-like bumps on his arms, chest and back \\nPatient states bumps appeared when he started Accuatne treatment to treat his acne \\nPatient is currently off Accutane, until arms are better \\nPatient was given a sample of Dueobri at today’s visit that I instructed him to only apply on one arm so that I can see if there is any improvement while using medication.\\n\\n\\n(Will not be performing IPL due to patient not tolerating treatment previously)\\nprevious treatment \\nDiscussed with patient that the laser/IPL device emits a range of lightwaves that are then tuned and targeted red bumps \\n--- As IPL was being performed, patient was complaining of laser being to painful \\nDiscussed with patient for laser to cause a burn on the ( upper shoulders) due to how thin skin is \\n--- Patient was advised to apply CeraVe healing ointment and ice to help with burning sensation \\nA numbing cream will be sent to the pharmacy ([facility]) for patient to apply before he comes in for his next IPL, he was also provided with email address --- [email or web address] to send progress pictures \\n\\nFollow up: 1 month for IPL

## 2019-08-20 NOTE — PROCEDURE: INTRALESIONAL KENALOG
Total Volume Injected (Ccs- Only Use Numbers And Decimals): 2
Medical Necessity Clause: This procedure was medically necessary because the lesions that were treated were:
X Size Of Lesion In Cm (Optional): 0
Include Z78.9 (Other Specified Conditions Influencing Health Status) As An Associated Diagnosis?: No
Kenalog Preparation: Kenalog with 5-fluorouracil
Treatment Number (Optional): 15
Concentration Of Solution Injected (Mg/Ml): 20.0
Consent: The risks of atrophy were reviewed with the patient.
Detail Level: Zone

## 2019-10-07 ENCOUNTER — APPOINTMENT (RX ONLY)
Dept: URBAN - METROPOLITAN AREA CLINIC 77 | Facility: CLINIC | Age: 24
Setting detail: DERMATOLOGY
End: 2019-10-07

## 2019-10-07 DIAGNOSIS — L85.3 XEROSIS CUTIS: ICD-10-CM

## 2019-10-07 DIAGNOSIS — Q826 OTHER SPECIFIED ANOMALIES OF SKIN: ICD-10-CM

## 2019-10-07 DIAGNOSIS — L63.8 OTHER ALOPECIA AREATA: ICD-10-CM

## 2019-10-07 DIAGNOSIS — Q819 OTHER SPECIFIED ANOMALIES OF SKIN: ICD-10-CM

## 2019-10-07 DIAGNOSIS — A63.0 ANOGENITAL (VENEREAL) WARTS: ICD-10-CM

## 2019-10-07 DIAGNOSIS — Q828 OTHER SPECIFIED ANOMALIES OF SKIN: ICD-10-CM

## 2019-10-07 PROBLEM — Q82.8 OTHER SPECIFIED CONGENITAL MALFORMATIONS OF SKIN: Status: ACTIVE | Noted: 2019-10-07

## 2019-10-07 PROCEDURE — 99213 OFFICE O/P EST LOW 20 MIN: CPT

## 2019-10-07 PROCEDURE — ? COUNSELING

## 2019-10-07 PROCEDURE — ? TREATMENT REGIMEN

## 2019-10-07 ASSESSMENT — LOCATION DETAILED DESCRIPTION DERM
LOCATION DETAILED: LEFT SUPERIOR PARIETAL SCALP
LOCATION DETAILED: LEFT DORSAL SHAFT OF PENIS
LOCATION DETAILED: MID TRAPEZIAL NECK
LOCATION DETAILED: RIGHT DORSAL SHAFT OF PENIS

## 2019-10-07 ASSESSMENT — LOCATION SIMPLE DESCRIPTION DERM
LOCATION SIMPLE: PENIS
LOCATION SIMPLE: TRAPEZIAL NECK
LOCATION SIMPLE: SCALP

## 2019-10-07 ASSESSMENT — LOCATION ZONE DERM
LOCATION ZONE: PENIS
LOCATION ZONE: SCALP
LOCATION ZONE: NECK

## 2019-10-07 NOTE — PROCEDURE: TREATMENT REGIMEN
Detail Level: Zone
Plan: Location: Posterior scalp and left scalp.\\nPrevious treatment: 5FU/ILK 40\\nTreatment:10/7/19 No Treatment Today\\nPrevious treatments-\\n04/23/2019 1st injection \\n05/14/2019  2nd injection \\n06/14/2019  3rd injection \\n07/16/2019   4th injection \\n08/20/2019 5th injection \\n10/7/19 No Injection today \\n\\nFollow up \\nLOV 8/20/19\\nPhotos taken\\n\\nPatient comes in today for a two month follow up.\\nHe states he saw a friend of his mother in a [place] that is a Dermatologist and was given an injection.\\nHe knows the doctor said something like high strength of \"Minoxidil.\"  Patient is unsure.\\nThe injection was just done last week on Thursday October 3rd 2019.\\nDiscussed with patient that under my dermatoscope there are hair follicles growing on the area on the back of his scalp.\\nThe one on the side of his left scalp shows great signs of hair growth as well.\\nDiscussed with patient that the hair growth are the results from previous injection treatments and that we are on the right path.\\nExplained to patient since he just had an injection a couple of days ago, today I will not perform any injections.\\nInstructed patient to obtain the dosage and what all medications were/was in the injection.\\nPlease call or email the office with the information so we can make note of it on his chart.\\nPatient states understanding and will email the information to us to be inputted into his chart.\\n\\nFollow up 1 month\\n---------------------------------------------------------------------------------------------------------------------------------\\nPatient is here for a one month follow up \\nPatient states that he’s noticed and has felt some new hair growth \\nToday I will continue to inject with 5FU/ILK 40 in addition I will also inject with straight Kenalog 40 to see if this further continues to help with hair growth.\\n\\nDiscussed with patient that there are new hair follicles developing where we last injected, however the circular bald patches have not resolved and are still noticeable \\n\\nDiscussed with patient this is also an auto-immune condition usually genetic, onset by increased stress and a lowered immune system.\\nInformed patient that we will continue with 5FU/ILK 40 to help relieve inflammation\\nHe was advised to continue using Rogaine to help produce hair growth from the external as well---discussed hair may fall out for the first few weeks. \\nAlso advised patient to try and keep stress under control in order to prevent further breakouts of hair loss.\\n\\nFollow up: 1 month
Plan: Location: B/L Arms, Chest, Back \\nPharmacy: [facility]\\nPrescription: Lidocaine numbing cream \\n\\nPrevious Treatment: IPL: 515, 15j, 10ms, 60% cooling\\n\\nFollow up\\nLOV 08/20/19\\nPhotos taken\\n\\nPatient comes in today for a 2 month follow up.\\nHe states that he tried the Duobrii for 3 days and did not like how it left his arms.\\nHe states it only dried out his skin and he felt it made it more scaly.\\nHe is using Cerave Moisturizing Cream on his arms to prevent scaliness.\\nHe saw his mother's friend who is a dermatologist in [place] on 10/3/19 and pt states the doctor compounded him a cream.\\nPatient does not know the ingredients, the cream should arrive today or tomorrow.\\nDiscussed with patient his KP is in better condition than before.\\nThe pigment is getting better and coming back slowly.\\nInstructed patient to use the compound medication and at his next appointment we can discuss how it worked for him.\\Elissa well, mentioned to patient to be sure to bring in the compounded cream so I can be able to refill it for him if the cream is helping his KP.\\nPatient states understanding.\\n\\nFollow up 1 month\\n-------------------------------------------------------------------------------------------------------\\nPbailee presents with rough patches and rough-like bumps on his arms, chest and back \\nPatient states bumps appeared when he started Accutane treatment to treat his acne \\nPatient is currently off Accutane, until arms are better \\nPatient was given a sample of Dueobri at today’s visit that I instructed him to only apply on one arm so that I can see if there is any improvement while using medication.\\n\\n\\n(Will not be performing IPL due to patient not tolerating treatment previously)\\nprevious treatment \\nDiscussed with patient that the laser/IPL device emits a range of lightwaves that are then tuned and targeted red bumps \\n--- As IPL was being performed, patient was complaining of laser being to painful \\nDiscussed with patient for laser to cause a burn on the ( upper shoulders) due to how thin skin is \\n--- Patient was advised to apply CeraVe healing ointment and ice to help with burning sensation \\nA numbing cream will be sent to the pharmacy ([facility]) for patient to apply before he comes in for his next IPL, he was also provided with email address --- [email or web address] to send progress pictures \\n\\nFollow up: 1 month for IPL
Samples Given: Duemarilynri
Plan: Location: Genital region  \\nTreatment: No treatment today \\n\\nFollow up\\nLOV 08/20/2019\\n\\nPatient comes in today for a two month follow up.\\nHe states the areas that were destructed in August have healed.\\nHe would like for Dr. Wynne to educate him.\\nHe would like to know how to distinguish new lesions of concern and what are hair follicles.\\nHe would like to know if he should still get the HPV Vaccination.\\n\\nDiscussed with patient:\\nWhat I am seeing is mostly post inflammatory pigmentation. \\Elissa well, I also see irritation, I don’t see any new lesions.\\nThe irritation can be from the the topical creams. (Veregen and Zyclara used by patient)\\nInstructed to patient for the next month I don’t want him to use any kind of creams.\\nExplained to patient how to identify a hair follicle and new growths. \\nThere is only one area of concern, but assured patient its a mole, but we will keep in observation.\\Uzma areas the patient pointed out that had him concerned are called Danny Gland Bumps and hair follicles, nothing to worry about.\\nRecommended to patient to pursue in getting the Gardasil 9 (a 3 series injection), informed him that [facility] the vaccine, he doesn’t have to get it only wit his PCP.\\nThere are no new lesions, so no treatment is required today, I will access in one month.\\n\\nFollow up 1 month.\\n------------------------------------------------------------------------------\\nPatient is here for a follow up\\nPatient wants to be sure there are no more warts \\n\\nDiscussed with patient that they are a common HPV viral infection that occurs during stress and immune system is unable to fight the virus.\\n— Today I will treat with cryotherapy today to allow immune system to fight off virus. \\nDiscussed with pt if wart blister ups, then can use sterile needle to poke and let fluid drain.\\nDiscussed with pt that they should leave skin attached to help provide a protective barrier while it is healing.\\n\\nFollow up: 1 month

## 2019-11-05 ENCOUNTER — APPOINTMENT (RX ONLY)
Dept: URBAN - METROPOLITAN AREA CLINIC 77 | Facility: CLINIC | Age: 24
Setting detail: DERMATOLOGY
End: 2019-11-05

## 2019-11-05 DIAGNOSIS — L63.8 OTHER ALOPECIA AREATA: ICD-10-CM

## 2019-11-05 DIAGNOSIS — Q828 OTHER SPECIFIED ANOMALIES OF SKIN: ICD-10-CM

## 2019-11-05 DIAGNOSIS — Q819 OTHER SPECIFIED ANOMALIES OF SKIN: ICD-10-CM

## 2019-11-05 DIAGNOSIS — Q826 OTHER SPECIFIED ANOMALIES OF SKIN: ICD-10-CM

## 2019-11-05 DIAGNOSIS — A63.0 ANOGENITAL (VENEREAL) WARTS: ICD-10-CM

## 2019-11-05 PROBLEM — Q82.8 OTHER SPECIFIED CONGENITAL MALFORMATIONS OF SKIN: Status: ACTIVE | Noted: 2019-11-05

## 2019-11-05 PROCEDURE — ? LIQUID NITROGEN GENITALS MULTI

## 2019-11-05 PROCEDURE — 99213 OFFICE O/P EST LOW 20 MIN: CPT | Mod: 25

## 2019-11-05 PROCEDURE — ? TREATMENT REGIMEN

## 2019-11-05 PROCEDURE — 17110 DESTRUCTION B9 LES UP TO 14: CPT

## 2019-11-05 PROCEDURE — ? COUNSELING

## 2019-11-05 ASSESSMENT — LOCATION ZONE DERM
LOCATION ZONE: SCALP
LOCATION ZONE: TRUNK
LOCATION ZONE: PENIS

## 2019-11-05 ASSESSMENT — LOCATION DETAILED DESCRIPTION DERM
LOCATION DETAILED: LEFT DORSAL SHAFT OF PENIS
LOCATION DETAILED: SUPRAPUBIC SKIN
LOCATION DETAILED: RIGHT DORSAL SHAFT OF PENIS
LOCATION DETAILED: LEFT SUPERIOR PARIETAL SCALP

## 2019-11-05 ASSESSMENT — LOCATION SIMPLE DESCRIPTION DERM
LOCATION SIMPLE: SCALP
LOCATION SIMPLE: GROIN
LOCATION SIMPLE: PENIS

## 2019-11-05 NOTE — PROCEDURE: LIQUID NITROGEN GENITALS MULTI
Render Post-Care Instructions In Note?: no
Post-Care Instructions: I reviewed with the patient in detail post-care instructions. Patient is to wear sunprotection, and avoid picking at any of the treated lesions. Pt may apply Vaseline to crusted or scabbing areas.
Detail Level: Zone
Consent: The patient's consent was obtained including but not limited to risks of crusting, scabbing, blistering, scarring, darker or lighter pigmentary change, recurrence, incomplete removal and infection.
Total Number Of Lesions Treated: 5

## 2019-11-05 NOTE — PROCEDURE: TREATMENT REGIMEN
Plan: Location: Posterior scalp and left scalp.\\nPrevious treatment: 5FU/ILK 40\\nTreatment:10/7/19 No Treatment Today\\nPrevious treatments-\\n04/23/2019 1st injection \\n05/14/2019  2nd injection \\n06/14/2019  3rd injection \\n07/16/2019   4th injection \\n08/20/2019 5th injection \\n10/7/19 No Injection today \\n\\nFollow up \\nLOV 8/20/19\\nPhotos taken\\n\\nPatient comes in today for a two month follow up.\\nHe states he saw a friend of his mother in a [place] that is a Dermatologist and was given an injection.\\nHe knows the doctor said something like high strength of \"Minoxidil.\"  Patient is unsure.\\nThe injection was just done last week on Thursday October 3rd 2019.\\nDiscussed with patient that under my dermatoscope there are hair follicles growing on the area on the back of his scalp.\\nThe one on the side of his left scalp shows great signs of hair growth as well.\\nDiscussed with patient that the hair growth are the results from previous injection treatments and that we are on the right path.\\nExplained to patient since he just had an injection a couple of days ago, today I will not perform any injections.\\nInstructed patient to obtain the dosage and what all medications were/was in the injection.\\nPlease call or email the office with the information so we can make note of it on his chart.\\nPatient states understanding and will email the information to us to be inputted into his chart.\\n\\nFollow up 1 month\\n---------------------------------------------------------------------------------------------------------------------------------\\nPatient is here for a one month follow up \\nPatient states that he’s noticed and has felt some new hair growth \\nToday I will continue to inject with 5FU/ILK 40 in addition I will also inject with straight Kenalog 40 to see if this further continues to help with hair growth.\\n\\nDiscussed with patient that there are new hair follicles developing where we last injected, however the circular bald patches have not resolved and are still noticeable \\n\\nDiscussed with patient this is also an auto-immune condition usually genetic, onset by increased stress and a lowered immune system.\\nInformed patient that we will continue with 5FU/ILK 40 to help relieve inflammation\\nHe was advised to continue using Rogaine to help produce hair growth from the external as well---discussed hair may fall out for the first few weeks. \\nAlso advised patient to try and keep stress under control in order to prevent further breakouts of hair loss.\\n\\nFollow up: 1 month
Detail Level: Zone
Plan: Location: B/L Arms, Chest, Back \\nPharmacy: [facility]\\nPrescription: Lidocaine numbing cream \\n\\nPrevious Treatment: IPL: 515, 15j, 10ms, 60% cooling\\n\\nFollow up\\nLOV 08/20/19\\nPhotos taken\\n\\nPatient comes in today for a 2 month follow up.\\nHe states that he tried the Duobrii for 3 days and did not like how it left his arms.\\nHe states it only dried out his skin and he felt it made it more scaly.\\nHe is using Cerave Moisturizing Cream on his arms to prevent scaliness.\\nHe saw his mother's friend who is a dermatologist in [place] on 10/3/19 and pt states the doctor compounded him a cream.\\nPatient does not know the ingredients, the cream should arrive today or tomorrow.\\nDiscussed with patient his KP is in better condition than before.\\nThe pigment is getting better and coming back slowly.\\nInstructed patient to use the compound medication and at his next appointment we can discuss how it worked for him.\\Elissa well, mentioned to patient to be sure to bring in the compounded cream so I can be able to refill it for him if the cream is helping his KP.\\nPatient states understanding.\\n\\nFollow up 1 month\\n-------------------------------------------------------------------------------------------------------\\nPbailee presents with rough patches and rough-like bumps on his arms, chest and back \\nPatient states bumps appeared when he started Accutane treatment to treat his acne \\nPatient is currently off Accutane, until arms are better \\nPatient was given a sample of Dueobri at today’s visit that I instructed him to only apply on one arm so that I can see if there is any improvement while using medication.\\n\\n\\n(Will not be performing IPL due to patient not tolerating treatment previously)\\nprevious treatment \\nDiscussed with patient that the laser/IPL device emits a range of lightwaves that are then tuned and targeted red bumps \\n--- As IPL was being performed, patient was complaining of laser being to painful \\nDiscussed with patient for laser to cause a burn on the ( upper shoulders) due to how thin skin is \\n--- Patient was advised to apply CeraVe healing ointment and ice to help with burning sensation \\nA numbing cream will be sent to the pharmacy ([facility]) for patient to apply before he comes in for his next IPL, he was also provided with email address --- [email or web address] to send progress pictures \\n\\nFollow up: 1 month for IPL
Samples Given: Duemarilynri
Plan: Location: Genital region  \\nTreatment: No treatment today \\n\\nFollow up\\nLOV 08/20/2019\\n\\nPatient comes in today for a two month follow up.\\nHe states the areas that were destructed in August have healed.\\nHe would like for Dr. Wynne to educate him.\\nHe would like to know how to distinguish new lesions of concern and what are hair follicles.\\nHe would like to know if he should still get the HPV Vaccination.\\n\\nDiscussed with patient:\\nWhat I am seeing is mostly post inflammatory pigmentation. \\Elissa well, I also see irritation, I don’t see any new lesions.\\nThe irritation can be from the the topical creams. (Veregen and Zyclara used by patient)\\nInstructed to patient for the next month I don’t want him to use any kind of creams.\\nExplained to patient how to identify a hair follicle and new growths. \\nThere is only one area of concern, but assured patient its a mole, but we will keep in observation.\\Uzma areas the patient pointed out that had him concerned are called Danny Gland Bumps and hair follicles, nothing to worry about.\\nRecommended to patient to pursue in getting the Gardasil 9 (a 3 series injection), informed him [facility] the vaccine, he doesn’t have to get it only wit his PCP.\\nThere are no new lesions, so no treatment is required today, I will access in one month.\\n\\nFollow up 1 month.\\n------------------------------------------------------------------------------\\nPatient is here for a follow up\\nPatient wants to be sure there are no more warts \\n\\nDiscussed with patient that they are a common HPV viral infection that occurs during stress and immune system is unable to fight the virus.\\n— Today I will treat with cryotherapy today to allow immune system to fight off virus. \\nDiscussed with pt if wart blister ups, then can use sterile needle to poke and let fluid drain.\\nDiscussed with pt that they should leave skin attached to help provide a protective barrier while it is healing.\\n\\nFollow up: 1 month

## 2019-12-09 ENCOUNTER — RX ONLY (OUTPATIENT)
Age: 24
Setting detail: RX ONLY
End: 2019-12-09

## 2019-12-09 ENCOUNTER — APPOINTMENT (RX ONLY)
Dept: URBAN - METROPOLITAN AREA CLINIC 77 | Facility: CLINIC | Age: 24
Setting detail: DERMATOLOGY
End: 2019-12-09

## 2019-12-09 DIAGNOSIS — L50.3 DERMATOGRAPHIC URTICARIA: ICD-10-CM

## 2019-12-09 DIAGNOSIS — Q819 OTHER SPECIFIED ANOMALIES OF SKIN: ICD-10-CM

## 2019-12-09 DIAGNOSIS — Q828 OTHER SPECIFIED ANOMALIES OF SKIN: ICD-10-CM

## 2019-12-09 DIAGNOSIS — L63.8 OTHER ALOPECIA AREATA: ICD-10-CM

## 2019-12-09 DIAGNOSIS — Q826 OTHER SPECIFIED ANOMALIES OF SKIN: ICD-10-CM

## 2019-12-09 DIAGNOSIS — A63.0 ANOGENITAL (VENEREAL) WARTS: ICD-10-CM

## 2019-12-09 PROBLEM — Q82.8 OTHER SPECIFIED CONGENITAL MALFORMATIONS OF SKIN: Status: ACTIVE | Noted: 2019-12-09

## 2019-12-09 PROCEDURE — 99213 OFFICE O/P EST LOW 20 MIN: CPT | Mod: 25

## 2019-12-09 PROCEDURE — ? COUNSELING

## 2019-12-09 PROCEDURE — ? LIQUID NITROGEN GENITALS MULTI

## 2019-12-09 PROCEDURE — ? TREATMENT REGIMEN

## 2019-12-09 RX ORDER — HYDROCORTISONE ACETATE, IODOQUINOL 19; 10 MG/G; MG/G
CREAM TOPICAL
Qty: 30 | Refills: 2 | Status: ERX

## 2019-12-09 ASSESSMENT — LOCATION DETAILED DESCRIPTION DERM
LOCATION DETAILED: SUPRAPUBIC SKIN
LOCATION DETAILED: LEFT DORSAL SHAFT OF PENIS
LOCATION DETAILED: RIGHT DORSAL SHAFT OF PENIS
LOCATION DETAILED: LEFT SUPERIOR PARIETAL SCALP

## 2019-12-09 ASSESSMENT — LOCATION ZONE DERM
LOCATION ZONE: PENIS
LOCATION ZONE: TRUNK
LOCATION ZONE: SCALP

## 2019-12-09 ASSESSMENT — LOCATION SIMPLE DESCRIPTION DERM
LOCATION SIMPLE: PENIS
LOCATION SIMPLE: SCALP
LOCATION SIMPLE: GROIN

## 2019-12-09 NOTE — PROCEDURE: TREATMENT REGIMEN
Detail Level: Zone
Plan: Location: Posterior scalp and left scalp.\\nPrevious treatment: 5FU/ILK 40\\nTreatment:10/7/19 No Treatment Today\\nPrevious treatments-\\n04/23/2019 1st injection \\n05/14/2019  2nd injection \\n06/14/2019  3rd injection \\n07/16/2019   4th injection \\n08/20/2019 5th injection \\n10/7/19 No Injection today \\n\\nFollow up \\nLOV 8/20/19\\nPhotos taken\\n\\nPatient comes in today for a two month follow up.\\nHe states he saw a friend of his mother in a [place] that is a Dermatologist and was given an injection.\\nHe knows the doctor said something like high strength of \"Minoxidil.\"  Patient is unsure.\\nThe injection was just done last week on Thursday October 3rd 2019.\\nDiscussed with patient that under my dermatoscope there are hair follicles growing on the area on the back of his scalp.\\nThe one on the side of his left scalp shows great signs of hair growth as well.\\nDiscussed with patient that the hair growth are the results from previous injection treatments and that we are on the right path.\\nExplained to patient since he just had an injection a couple of days ago, today I will not perform any injections.\\nInstructed patient to obtain the dosage and what all medications were/was in the injection.\\nPlease call or email the office with the information so we can make note of it on his chart.\\nPatient states understanding and will email the information to us to be inputted into his chart.\\n\\nFollow up 1 month\\n---------------------------------------------------------------------------------------------------------------------------------\\nPatient is here for a one month follow up \\nPatient states that he’s noticed and has felt some new hair growth \\nToday I will continue to inject with 5FU/ILK 40 in addition I will also inject with straight Kenalog 40 to see if this further continues to help with hair growth.\\n\\nDiscussed with patient that there are new hair follicles developing where we last injected, however the circular bald patches have not resolved and are still noticeable \\n\\nDiscussed with patient this is also an auto-immune condition usually genetic, onset by increased stress and a lowered immune system.\\nInformed patient that we will continue with 5FU/ILK 40 to help relieve inflammation\\nHe was advised to continue using Rogaine to help produce hair growth from the external as well---discussed hair may fall out for the first few weeks. \\nAlso advised patient to try and keep stress under control in order to prevent further breakouts of hair loss.\\n\\nFollow up: 1 month
Plan: Location: B/L Arms, Chest, Back \\nPharmacy: [facility]\\nPrescription: Lidocaine numbing cream \\n\\nPrevious Treatment: IPL: 515, 15j, 10ms, 60% cooling\\n\\nFollow up\\nLOV 08/20/19\\nPhotos taken\\n\\nPatient comes in today for a 2 month follow up.\\nHe states that he tried the Duobrii for 3 days and did not like how it left his arms.\\nHe states it only dried out his skin and he felt it made it more scaly.\\nHe is using Cerave Moisturizing Cream on his arms to prevent scaliness.\\nHe saw his mother's friend who is a dermatologist in [place] on 10/3/19 and pt states the doctor compounded him a cream.\\nPatient does not know the ingredients, the cream should arrive today or tomorrow.\\nDiscussed with patient his KP is in better condition than before.\\nThe pigment is getting better and coming back slowly.\\nInstructed patient to use the compound medication and at his next appointment we can discuss how it worked for him.\\Elissa well, mentioned to patient to be sure to bring in the compounded cream so I can be able to refill it for him if the cream is helping his KP.\\nPatient states understanding.\\n\\nFollow up 1 month\\n-------------------------------------------------------------------------------------------------------\\nPbailee presents with rough patches and rough-like bumps on his arms, chest and back \\nPatient states bumps appeared when he started Accutane treatment to treat his acne \\nPatient is currently off Accutane, until arms are better \\nPatient was given a sample of Dueobri at today’s visit that I instructed him to only apply on one arm so that I can see if there is any improvement while using medication.\\n\\n\\n(Will not be performing IPL due to patient not tolerating treatment previously)\\nprevious treatment \\nDiscussed with patient that the laser/IPL device emits a range of lightwaves that are then tuned and targeted red bumps \\n--- As IPL was being performed, patient was complaining of laser being to painful \\nDiscussed with patient for laser to cause a burn on the ( upper shoulders) due to how thin skin is \\n--- Patient was advised to apply CeraVe healing ointment and ice to help with burning sensation \\nA numbing cream will be sent to the pharmacy ([facility]) for patient to apply before he comes in for his next IPL, he was also provided with email address --- [email or web address] to send progress pictures \\n\\nFollow up: 1 month for IPL
Samples Given: Duemarilynri
Plan: Location: Genital region  \\nTreatment: No treatment today \\n\\nFollow up\\nLOV 08/20/2019\\n\\nPatient comes in today for a two month follow up.\\nHe states the areas that were destructed in August have healed.\\nHe would like for Dr. Wynne to educate him.\\nHe would like to know how to distinguish new lesions of concern and what are hair follicles.\\nHe would like to know if he should still get the HPV Vaccination.\\n\\nDiscussed with patient:\\nWhat I am seeing is mostly post inflammatory pigmentation. \\Elissa well, I also see irritation, I don’t see any new lesions.\\nThe irritation can be from the the topical creams. (Veregen and Zyclara used by patient)\\nInstructed to patient for the next month I don’t want him to use any kind of creams.\\nExplained to patient how to identify a hair follicle and new growths. \\nThere is only one area of concern, but assured patient its a mole, but we will keep in observation.\\Uzma areas the patient pointed out that had him concerned are called Danny Gland Bumps and hair follicles, nothing to worry about.\\nRecommended to patient to pursue in getting the Gardasil 9 (a 3 series injection), informed him [facility], he doesn’t have to get it only wit his PCP.\\nThere are no new lesions, so no treatment is required today, I will access in one month.\\n\\nFollow up 1 month.\\n------------------------------------------------------------------------------\\nPatient is here for a follow up\\nPatient wants to be sure there are no more warts \\n\\nDiscussed with patient that they are a common HPV viral infection that occurs during stress and immune system is unable to fight the virus.\\n— Today I will treat with cryotherapy today to allow immune system to fight off virus. \\nDiscussed with pt if wart blister ups, then can use sterile needle to poke and let fluid drain.\\nDiscussed with pt that they should leave skin attached to help provide a protective barrier while it is healing.\\n\\nFollow up: 1 month
Plan: Location: body\\n\\nDermatographism was drawn to detect histamine level. \\nPatient has high histamine level and advised to start taking Allegra BID, especially during allergy season.\\nF/u as needed

## 2019-12-09 NOTE — PROCEDURE: LIQUID NITROGEN GENITALS MULTI
Post-Care Instructions: I reviewed with the patient in detail post-care instructions. Patient is to wear sunprotection, and avoid picking at any of the treated lesions. Pt may apply Vaseline to crusted or scabbing areas.
Total Number Of Lesions Treated: 5
Detail Level: Zone
Render Post-Care Instructions In Note?: no
Consent: The patient's consent was obtained including but not limited to risks of crusting, scabbing, blistering, scarring, darker or lighter pigmentary change, recurrence, incomplete removal and infection.

## 2020-01-13 ENCOUNTER — APPOINTMENT (RX ONLY)
Dept: URBAN - METROPOLITAN AREA CLINIC 77 | Facility: CLINIC | Age: 25
Setting detail: DERMATOLOGY
End: 2020-01-13

## 2020-01-13 DIAGNOSIS — Q828 OTHER SPECIFIED ANOMALIES OF SKIN: ICD-10-CM

## 2020-01-13 DIAGNOSIS — L63.8 OTHER ALOPECIA AREATA: ICD-10-CM

## 2020-01-13 DIAGNOSIS — A63.0 ANOGENITAL (VENEREAL) WARTS: ICD-10-CM

## 2020-01-13 DIAGNOSIS — Q826 OTHER SPECIFIED ANOMALIES OF SKIN: ICD-10-CM

## 2020-01-13 DIAGNOSIS — Q819 OTHER SPECIFIED ANOMALIES OF SKIN: ICD-10-CM

## 2020-01-13 PROBLEM — Q82.8 OTHER SPECIFIED CONGENITAL MALFORMATIONS OF SKIN: Status: ACTIVE | Noted: 2020-01-13

## 2020-01-13 PROCEDURE — ? LIQUID NITROGEN GENITALS MULTI

## 2020-01-13 PROCEDURE — ? TREATMENT REGIMEN

## 2020-01-13 PROCEDURE — 99213 OFFICE O/P EST LOW 20 MIN: CPT | Mod: 25

## 2020-01-13 PROCEDURE — ? PRESCRIPTION

## 2020-01-13 PROCEDURE — ? COUNSELING

## 2020-01-13 ASSESSMENT — LOCATION DETAILED DESCRIPTION DERM
LOCATION DETAILED: LEFT PROXIMAL POSTERIOR UPPER ARM
LOCATION DETAILED: RIGHT DORSAL SHAFT OF PENIS
LOCATION DETAILED: SUPRAPUBIC SKIN
LOCATION DETAILED: LEFT SUPERIOR PARIETAL SCALP
LOCATION DETAILED: RIGHT PROXIMAL POSTERIOR UPPER ARM
LOCATION DETAILED: LEFT CENTRAL FRONTAL SCALP
LOCATION DETAILED: LEFT DORSAL SHAFT OF PENIS

## 2020-01-13 ASSESSMENT — LOCATION SIMPLE DESCRIPTION DERM
LOCATION SIMPLE: PENIS
LOCATION SIMPLE: GROIN
LOCATION SIMPLE: LEFT SCALP
LOCATION SIMPLE: LEFT UPPER ARM
LOCATION SIMPLE: SCALP
LOCATION SIMPLE: RIGHT UPPER ARM

## 2020-01-13 ASSESSMENT — LOCATION ZONE DERM
LOCATION ZONE: TRUNK
LOCATION ZONE: ARM
LOCATION ZONE: PENIS
LOCATION ZONE: SCALP

## 2020-01-13 ASSESSMENT — SEVERITY ASSESSMENT: SEVERITY: MILD TO MODERATE

## 2020-01-13 NOTE — PROCEDURE: TREATMENT REGIMEN
Plan: Location: Posterior scalp and left scalp.\\nPrevious treatment: 5FU/ILK 40\\nTreatment: 01/13/2020 NO INJECTIONS TODAY\\nPrescribe: Minoxidil/Latano 50mg/.005% topical solution \\nSample: Lexette 0.05% foam \\nPrevious treatments-\\n04/23/2019 1st injection \\n05/14/2019  2nd injection \\n06/14/2019  3rd injection \\n07/16/2019   4th injection \\n08/20/2019 5th injection \\n10/7/19 No Injection today \\n\\nPt is here to follow up for hair loss on the scalp\\nHe has had multiple ILK injections in which it seems to have helped reduce inflammation\\nThe circular patches are not visible today and hair growth is noticeable\\nToday Pt states he does not want to continue ILK injections and is concerned of atrophy from the injections\\nI reassured him that atrophy is temporary. \\nHis previous dermatologist started him on a compounded topical that contained minoxidil and Latano in which he is requesting a refill on. Will send to [facility]\\nFollow up: 6 weeks
Detail Level: Zone
Plan: Location: B/L Arms, Chest, Back \\nPharmacy: [facility]\\nPrevious medications: Duobri and Vytone topical cream \\nPrevious Treatment: IPL: 515, 15j, 10ms, 60% cooling\\n\\nPt is here for a follow up after using Duobri along with the Vytone cream.\\nHe didn’t find much relief with the Duobri and stopped using it\\nHis previous dermatologist prescribed him a compounded cream that contained SA with Hydroquinone and that seems to be keeping his KP under control along with the Vytone cream\\nPt is content with the results and feels KP is less bumpy\\nHe was also using OTC Cerave with SA which seems to be helping \\nLitanopris
Samples Given: Duemarilynri
Plan: Location: Genital region  \\nTreatment: LN2\\n\\nPt is here to follow up after having multiple cryotherapy treatments \\nThere are few genital warts today that we will continue to treat with LN2 and reassured him that he is improving \\nHe is doing the Gardasil 9 series injection and he is on the second treatment. \\nWill continue in 6 weeks to improvement

## 2020-01-13 NOTE — PROCEDURE: LIQUID NITROGEN GENITALS MULTI
Consent: The patient's consent was obtained including but not limited to risks of crusting, scabbing, blistering, scarring, darker or lighter pigmentary change, recurrence, incomplete removal and infection.
Total Number Of Lesions Treated: 5
Detail Level: Zone
Post-Care Instructions: I reviewed with the patient in detail post-care instructions. Patient is to wear sunprotection, and avoid picking at any of the treated lesions. Pt may apply Vaseline to crusted or scabbing areas.
Render Post-Care Instructions In Note?: no

## 2020-01-15 NOTE — PROCEDURE: TREATMENT REGIMEN
Otc Regimen: Recommended minoxidil or Rogaine foam to spots
Detail Level: Simple
Plan: Discussed that although Accutane has been associated with hairloss this is not the typical presentation. Pt asked if he should stop Accutane- at this point I told him I would continue treatment and can discuss further with Dr. Wynne when he returns. He was given my card with my cell phone so that if his hairloss starts to rapidly progress he can get in touch with me to get him back in or discuss other treatment options. He agreed to continue the Accutane and discuss with Dr. Wynne when he returns
1.8

## 2020-02-24 ENCOUNTER — APPOINTMENT (RX ONLY)
Dept: URBAN - METROPOLITAN AREA CLINIC 77 | Facility: CLINIC | Age: 25
Setting detail: DERMATOLOGY
End: 2020-02-24

## 2020-02-24 DIAGNOSIS — A63.0 ANOGENITAL (VENEREAL) WARTS: ICD-10-CM

## 2020-02-24 DIAGNOSIS — L50.3 DERMATOGRAPHIC URTICARIA: ICD-10-CM

## 2020-02-24 DIAGNOSIS — L63.8 OTHER ALOPECIA AREATA: ICD-10-CM

## 2020-02-24 PROCEDURE — ? LIQUID NITROGEN GENITALS MULTI

## 2020-02-24 PROCEDURE — ? TREATMENT REGIMEN

## 2020-02-24 PROCEDURE — ? COUNSELING

## 2020-02-24 PROCEDURE — 54056 CRYOSURGERY PENIS LESION(S): CPT

## 2020-02-24 PROCEDURE — 99213 OFFICE O/P EST LOW 20 MIN: CPT | Mod: 25

## 2020-02-24 ASSESSMENT — LOCATION SIMPLE DESCRIPTION DERM
LOCATION SIMPLE: LEFT SCALP
LOCATION SIMPLE: PENIS
LOCATION SIMPLE: SCALP

## 2020-02-24 ASSESSMENT — LOCATION ZONE DERM
LOCATION ZONE: SCALP
LOCATION ZONE: PENIS

## 2020-02-24 ASSESSMENT — LOCATION DETAILED DESCRIPTION DERM
LOCATION DETAILED: LEFT DORSAL SHAFT OF PENIS
LOCATION DETAILED: RIGHT DORSAL SHAFT OF PENIS
LOCATION DETAILED: LEFT CENTRAL FRONTAL SCALP
LOCATION DETAILED: LEFT SUPERIOR PARIETAL SCALP

## 2020-02-24 NOTE — PROCEDURE: TREATMENT REGIMEN
Plan: Location: Posterior scalp and left scalp.\\nPharmacy: [facility] \\nUSING: Compounded solution minoxidil and Latano (prescribed by a different provider)\\n\\n\\nPrevious treatments-\\n04/23/2019 1st injection \\n05/14/2019  2nd injection \\n06/14/2019  3rd injection \\n07/16/2019   4th injection \\n08/20/2019 5th injection -- LAST INJECTION \\n\\n2- \\n\\nPhoto taken \\n\\nPatient is here for a follow up \\nPatient states he is using a compounded medication that contains minoxidil and Latano Prescribed by a different dermatologist in [place] \\nPatient states he has noticed and felt significant hair growth \\n\\nDiscussed with patient that after examining his scalp it does appear to present significant hair growth \\nExpressed to patient that there is light at the end of the tunnel, patient also expressed he is very happy with how his hair has grown out \\nToday i will have patient continue using compounded solution since he is responding well on treatment \\nPhotos were taken to compare and presented to patient \\n\\n\\nFollow up: 2-3 months \\n——————\\nPREVIOUS VISIT \\n\\nPt is here to follow up for hair loss on the scalp\\nHe has had multiple ILK injections in which it seems to have helped reduce inflammation\\nThe circular patches are not visible today and hair growth is noticeable\\nToday Pt states he does not want to continue ILK injections and is concerned of atrophy from the injections\\nI reassured him that atrophy is temporary. \\nHis previous dermatologist started him on a compounded topical that contained minoxidil and Latano in which he is requesting a refill on. Will send to [facility]\\nFollow up: 6 weeks
Detail Level: Zone
Plan: Location: Genital region  \\nPREVIOUS Treatment: LN2\\nTreatment TODAY: LN2\\n\\n\\n02/24/2020\\n\\n-- No photo per patient \\n\\nPatient is here for a follow up \\nPatient presents with 1 active wart located on the genital region \\nPatient states other warts that have been treated have stayed resolved \\n\\nDiscussed with patient that they are a common HPV viral infection that occurs during stress and immune system is unable to fight the virus.\\n TODAY i will treat active wart with cryotherapy that will allow immune system to fight off virus\\nDiscussed with patient if wart blister ups, then can use sterile needle to poke and let fluid drain\\nDiscussed with patient that he should leave skin attached to help provide a protective barrier while it is healing\\n\\nFollow up: 2-3 months \\n------------\\nPREVIOUS VISIT \\n\\nPt is here to follow up after having multiple cryotherapy treatments \\nThere are few genital warts today that we will continue to treat with LN2 and reassured him that he is improving \\nHe is doing the Gardasil 9 series injection and he is on the second treatment. \\nWill continue in 6 weeks to improvement
Plan: Location: Body \\n\\nDermatographism was drawn on the left chest to detect histamine level\\nExplained to patient that he has high histamine level, advised to start taking Allegra two times a day (at the same time), especially during allergy season this will also help with itching sensation he is experiencing \\n\\nFollow up: as needed

## 2020-02-24 NOTE — PROCEDURE: LIQUID NITROGEN GENITALS MULTI
Detail Level: Zone
Total Number Of Lesions Treated: 5
Post-Care Instructions: I reviewed with the patient in detail post-care instructions. Patient is to wear sunprotection, and avoid picking at any of the treated lesions. Pt may apply Vaseline to crusted or scabbing areas.
Consent: The patient's consent was obtained including but not limited to risks of crusting, scabbing, blistering, scarring, darker or lighter pigmentary change, recurrence, incomplete removal and infection.
Render Post-Care Instructions In Note?: no

## 2020-06-09 ENCOUNTER — APPOINTMENT (RX ONLY)
Dept: URBAN - METROPOLITAN AREA CLINIC 77 | Facility: CLINIC | Age: 25
Setting detail: DERMATOLOGY
End: 2020-06-09

## 2020-06-09 DIAGNOSIS — Q826 OTHER SPECIFIED ANOMALIES OF SKIN: ICD-10-CM

## 2020-06-09 DIAGNOSIS — L63.8 OTHER ALOPECIA AREATA: ICD-10-CM

## 2020-06-09 DIAGNOSIS — Q819 OTHER SPECIFIED ANOMALIES OF SKIN: ICD-10-CM

## 2020-06-09 DIAGNOSIS — Q828 OTHER SPECIFIED ANOMALIES OF SKIN: ICD-10-CM

## 2020-06-09 DIAGNOSIS — A63.0 ANOGENITAL (VENEREAL) WARTS: ICD-10-CM

## 2020-06-09 PROBLEM — Q82.8 OTHER SPECIFIED CONGENITAL MALFORMATIONS OF SKIN: Status: ACTIVE | Noted: 2020-06-09

## 2020-06-09 PROCEDURE — ? TREATMENT REGIMEN

## 2020-06-09 PROCEDURE — ? LIQUID NITROGEN GENITALS MULTI

## 2020-06-09 PROCEDURE — 99214 OFFICE O/P EST MOD 30 MIN: CPT | Mod: 25

## 2020-06-09 PROCEDURE — ? PRESCRIPTION

## 2020-06-09 PROCEDURE — ? COUNSELING

## 2020-06-09 ASSESSMENT — LOCATION ZONE DERM
LOCATION ZONE: SCALP
LOCATION ZONE: ARM
LOCATION ZONE: PENIS

## 2020-06-09 ASSESSMENT — LOCATION DETAILED DESCRIPTION DERM
LOCATION DETAILED: RIGHT DORSAL SHAFT OF PENIS
LOCATION DETAILED: RIGHT PROXIMAL POSTERIOR UPPER ARM
LOCATION DETAILED: LEFT PROXIMAL POSTERIOR UPPER ARM
LOCATION DETAILED: LEFT DORSAL SHAFT OF PENIS
LOCATION DETAILED: LEFT SUPERIOR PARIETAL SCALP
LOCATION DETAILED: LEFT CENTRAL FRONTAL SCALP

## 2020-06-09 ASSESSMENT — LOCATION SIMPLE DESCRIPTION DERM
LOCATION SIMPLE: RIGHT UPPER ARM
LOCATION SIMPLE: SCALP
LOCATION SIMPLE: PENIS
LOCATION SIMPLE: LEFT SCALP
LOCATION SIMPLE: LEFT UPPER ARM

## 2020-06-09 NOTE — PROCEDURE: LIQUID NITROGEN GENITALS MULTI
Consent: The patient's consent was obtained including but not limited to risks of crusting, scabbing, blistering, scarring, darker or lighter pigmentary change, recurrence, incomplete removal and infection.
Post-Care Instructions: I reviewed with the patient in detail post-care instructions. Patient is to wear sunprotection, and avoid picking at any of the treated lesions. Pt may apply Vaseline to crusted or scabbing areas.
Detail Level: Zone
Render Post-Care Instructions In Note?: no
Total Number Of Lesions Treated: 5

## 2020-06-09 NOTE — PROCEDURE: TREATMENT REGIMEN
Detail Level: Zone
Plan: Location: Posterior scalp and left scalp.\\nPharmacy: [facility] \\nUSING: Compounded solution minoxidil and Latano (prescribed by a different provider)\\n\\n\\nPrevious treatments-\\n04/23/2019 1st injection \\n05/14/2019  2nd injection \\n06/14/2019  3rd injection \\n07/16/2019   4th injection \\n08/20/2019 5th injection -- LAST INJECTION \\n06/09/2020 5FU/K40 Injections \\n\\nFollow up \\nLOV 02/24/2020\\nPhotos taken \\n\\nPt is here for a follow up appointment \\nPt stopped using the hair compound medication that was given to him in [place] \\nPt states the areas that were being treated with 5FU/K40 Injections have resolved \\nPt states there are three new spots that recently have appeared.\\n\\nDiscussed with pt: \\nReassured pt that now seeing the old ones going away and getting new ones is telling us that this hair loss was not tied to Accutane which I suspected from the beginning \\nEducated and had discussion with pt about Xeljanz which is Janus kinase (GUNNER) inhibitor that helps with inflammation\\nIt is currently going through trials for Alopecia Areata and it is used for psoriasis \\nI would like for him to go home and read about Xeljanz \\nToday I will inject 5FU/K40 to the new spots and he will come back in one month for continuation of care.\\nPt should continue the compounds that were given to him from the [place] dermatologist(mother's friend)\\nPt states understanding. \\n--------------------------------------------------------------------\\n2- \\n\\nPhoto taken \\n\\nPatient is here for a follow up \\nPatient states he is using a compounded medication that contains minoxidil and Latano Prescribed by a different dermatologist in [place] \\nPatient states he has noticed and felt significant hair growth \\n\\nDiscussed with patient that after examining his scalp it does appear to present significant hair growth \\nExpressed to patient that there is light at the end of the tunnel, patient also expressed he is very happy with how his hair has grown out \\nToday i will have patient continue using compounded solution since he is responding well on treatment \\nPhotos were taken to compare and presented to patient \\n\\n\\nFollow up: 2-3 months \\n——————\\nPREVIOUS VISIT \\n\\nPt is here to follow up for hair loss on the scalp\\nHe has had multiple ILK injections in which it seems to have helped reduce inflammation\\nThe circular patches are not visible today and hair growth is noticeable\\nToday Pt states he does not want to continue ILK injections and is concerned of atrophy from the injections\\nI reassured him that atrophy is temporary. \\nHis previous dermatologist started him on a compounded topical that contained minoxidil and Latano in which he is requesting a refill on. Will send to [facility]\\nFollow up: 6 weeks
Plan: Location: Genital region  \\nPREVIOUS Treatment: LN2\\nTreatment TODAY: LN2\\n\\nFollow up\\nLOV 02/24/2020\\nNo photos Pt\\n\\nPt is here for a 4 month follow up\\nHe would like reassurance on some new areas that he noticed the past few weeks.\\nPt states Veregen has not helped him.\\nHe would like to know about auto -immune supplements.\\n\\nEducated pt about being healthy is number one and any immune inhibitors can help boost up, I am not against him trying \\nPt has a very hyperactive immune system and he will always be more of a dermatologist project \\nToday I will prescribe him imuquimod for him to try to the affected areas once nightly.\\nReiterated pt about the difference in how to eyeball a genital wart and his glands \\n\\nFollow up 1 month\\n------------------------------------------------------------------------------\\n\\n02/24/2020\\n\\n-- No photo per patient \\n\\nPatient is here for a follow up \\nPatient presents with 1 active wart located on the genital region \\nPatient states other warts that have been treated have stayed resolved \\n\\nDiscussed with patient that they are a common HPV viral infection that occurs during stress and immune system is unable to fight the virus.\\n TODAY i will treat active wart with cryotherapy that will allow immune system to fight off virus\\nDiscussed with patient if wart blister ups, then can use sterile needle to poke and let fluid drain\\nDiscussed with patient that he should leave skin attached to help provide a protective barrier while it is healing\\n\\nFollow up: 2-3 months \\n------------\\nPREVIOUS VISIT \\n\\nPt is here to follow up after having multiple cryotherapy treatments \\nThere are few genital warts today that we will continue to treat with LN2 and reassured him that he is improving \\nHe is doing the Gardasil 9 series injection and he is on the second treatment. \\nWill continue in 6 weeks to improvement
Samples Given: Duemarilynri
Plan: Location: B/L Arms, Chest, Back \\nPharmacy: [facility]\\nPrevious medications: Duobri and Vytone topical cream \\nPrevious Treatment: IPL: 515, 15j, 10ms, 60% cooling\\n\\nFollow up \\nLOV 01/13/2020\\nPhotos taken\\n\\nPt comes in today for a 4 month follow up\\nPt states he has been using the Cerave MC daily and reapplying and has really helped from having the KP raised and bumpy.\\nPt states he has been using the compound creams prescribed to him from the [place] provider (mother's friend) off and on.\\nHe does not apply soap to the areas.\\n\\nDiscussed with pt:\\nI will give him a sample of Fabior today and I only want him to use a MM size to his arms twice a week at night \\nPt is to use the triamcinolone he has at home in the mornings after using the Fabior\\nDiscussed with pt If arms become irritated we will have him stop this regimen.\\nI would like to give fabior a chance; so for now I would ask for him to put the other two creams that were given to him in [place] aside for one month \\nPt stated understanding.\\n\\nFollow up 1 month\\n--------------------------------------------------------\\nPt is here for a follow up after using Duobri along with the Vytone cream.\\nHe didn’t find much relief with the Duobri and stopped using it\\nHis previous dermatologist prescribed him a compounded cream that contained SA with Hydroquinone and that seems to be keeping his KP under control along with the Vytone cream\\nPt is content with the results and feels KP is less bumpy\\nHe was also using OTC Cerave with SA which seems to be helping \\nLitanopris

## 2020-06-16 RX ORDER — IMIQUIMOD 37.5 MG/G
CREAM TOPICAL
Qty: 1 | Refills: 0 | Status: ERX | COMMUNITY
Start: 2020-06-16

## 2020-06-16 RX ADMIN — IMIQUIMOD: 37.5 CREAM TOPICAL at 00:00

## 2020-08-10 ENCOUNTER — APPOINTMENT (RX ONLY)
Dept: URBAN - METROPOLITAN AREA CLINIC 77 | Facility: CLINIC | Age: 25
Setting detail: DERMATOLOGY
End: 2020-08-10

## 2020-08-10 DIAGNOSIS — A63.0 ANOGENITAL (VENEREAL) WARTS: ICD-10-CM

## 2020-08-10 DIAGNOSIS — Q828 OTHER SPECIFIED ANOMALIES OF SKIN: ICD-10-CM

## 2020-08-10 DIAGNOSIS — Q819 OTHER SPECIFIED ANOMALIES OF SKIN: ICD-10-CM

## 2020-08-10 DIAGNOSIS — L63.8 OTHER ALOPECIA AREATA: ICD-10-CM

## 2020-08-10 DIAGNOSIS — Q826 OTHER SPECIFIED ANOMALIES OF SKIN: ICD-10-CM

## 2020-08-10 PROBLEM — Q82.8 OTHER SPECIFIED CONGENITAL MALFORMATIONS OF SKIN: Status: ACTIVE | Noted: 2020-08-10

## 2020-08-10 PROCEDURE — ? TREATMENT REGIMEN

## 2020-08-10 PROCEDURE — ? COUNSELING

## 2020-08-10 PROCEDURE — 99213 OFFICE O/P EST LOW 20 MIN: CPT

## 2020-08-10 PROCEDURE — ? PRESCRIPTION

## 2020-08-10 ASSESSMENT — LOCATION SIMPLE DESCRIPTION DERM
LOCATION SIMPLE: RIGHT UPPER ARM
LOCATION SIMPLE: PENIS
LOCATION SIMPLE: LEFT SCALP
LOCATION SIMPLE: SCALP
LOCATION SIMPLE: LEFT UPPER ARM

## 2020-08-10 ASSESSMENT — LOCATION ZONE DERM
LOCATION ZONE: SCALP
LOCATION ZONE: ARM
LOCATION ZONE: PENIS

## 2020-08-10 ASSESSMENT — LOCATION DETAILED DESCRIPTION DERM
LOCATION DETAILED: LEFT DORSAL SHAFT OF PENIS
LOCATION DETAILED: LEFT PROXIMAL POSTERIOR UPPER ARM
LOCATION DETAILED: LEFT SUPERIOR PARIETAL SCALP
LOCATION DETAILED: RIGHT PROXIMAL POSTERIOR UPPER ARM
LOCATION DETAILED: LEFT CENTRAL FRONTAL SCALP
LOCATION DETAILED: RIGHT DORSAL SHAFT OF PENIS

## 2020-08-10 NOTE — PROCEDURE: TREATMENT REGIMEN
Detail Level: Zone
Plan: Location: Posterior scalp and left scalp.\\nPharmacy: [facility] \\nUSING: Compounded solution minoxidil and Latano (prescribed by a different provider)\\n\\n\\nPrevious treatments-\\n04/23/2019 1st injection \\n05/14/2019  2nd injection \\n06/14/2019  3rd injection \\n07/16/2019   4th injection \\n08/20/2019 5th injection -- LAST INJECTION \\n06/09/2020 5FU/K40 Injections \\n\\nFollow up \\nLOV 02/24/2020\\nPhotos taken \\n\\nPt is here for a follow up appointment \\nPt stopped using the hair compound medication that was given to him in [place] \\nPt states the areas that were being treated with 5FU/K40 Injections have resolved \\nPt states there are three new spots that recently have appeared.\\n\\nDiscussed with pt: \\nReassured pt that now seeing the old ones going away and getting new ones is telling us that this hair loss was not tied to Accutane which I suspected from the beginning \\nEducated and had discussion with pt about Xeljanz which is Janus kinase (GUNNER) inhibitor that helps with inflammation\\nIt is currently going through trials for Alopecia Areata and it is used for psoriasis \\nI would like for him to go home and read about Xeljanz \\nToday I will inject 5FU/K40 to the new spots and he will come back in one month for continuation of care.\\nPt should continue the compounds that were given to him from the [place] dermatologist(mother's friend)\\nPt states understanding. \\n--------------------------------------------------------------------\\n2- \\n\\nPhoto taken \\n\\nPatient is here for a follow up \\nPatient states he is using a compounded medication that contains minoxidil and Latano Prescribed by a different dermatologist in [place] \\nPatient states he has noticed and felt significant hair growth \\n\\nDiscussed with patient that after examining his scalp it does appear to present significant hair growth \\nExpressed to patient that there is light at the end of the tunnel, patient also expressed he is very happy with how his hair has grown out \\nToday i will have patient continue using compounded solution since he is responding well on treatment \\nPhotos were taken to compare and presented to patient \\n\\n\\nFollow up: 2-3 months \\n——————\\nPREVIOUS VISIT \\n\\nPt is here to follow up for hair loss on the scalp\\nHe has had multiple ILK injections in which it seems to have helped reduce inflammation\\nThe circular patches are not visible today and hair growth is noticeable\\nToday Pt states he does not want to continue ILK injections and is concerned of atrophy from the injections\\nI reassured him that atrophy is temporary. \\nHis previous dermatologist started him on a compounded topical that contained minoxidil and Latano in which he is requesting a refill on. Will send to [facility]\\nFollow up: 6 weeks
Plan: Location: Genital region  \\nPREVIOUS Treatment: LN2\\nTreatment TODAY: LN2\\n\\nFollow up\\nLOV 02/24/2020\\nNo photos Pt\\n\\nPt is here for a 4 month follow up\\nHe would like reassurance on some new areas that he noticed the past few weeks.\\nPt states Veregen has not helped him.\\nHe would like to know about auto -immune supplements.\\n\\nEducated pt about being healthy is number one and any immune inhibitors can help boost up, I am not against him trying \\nPt has a very hyperactive immune system and he will always be more of a dermatologist project \\nToday I will prescribe him imuquimod for him to try to the affected areas once nightly.\\nReiterated pt about the difference in how to eyeball a genital wart and his glands \\n\\nFollow up 1 month\\n------------------------------------------------------------------------------\\n\\n02/24/2020\\n\\n-- No photo per patient \\n\\nPatient is here for a follow up \\nPatient presents with 1 active wart located on the genital region \\nPatient states other warts that have been treated have stayed resolved \\n\\nDiscussed with patient that they are a common HPV viral infection that occurs during stress and immune system is unable to fight the virus.\\n TODAY i will treat active wart with cryotherapy that will allow immune system to fight off virus\\nDiscussed with patient if wart blister ups, then can use sterile needle to poke and let fluid drain\\nDiscussed with patient that he should leave skin attached to help provide a protective barrier while it is healing\\n\\nFollow up: 2-3 months \\n------------\\nPREVIOUS VISIT \\n\\nPt is here to follow up after having multiple cryotherapy treatments \\nThere are few genital warts today that we will continue to treat with LN2 and reassured him that he is improving \\nHe is doing the Gardasil 9 series injection and he is on the second treatment. \\nWill continue in 6 weeks to improvement
Samples Given: Duemarilynri
Plan: Location: B/L Arms, Chest, Back \\nPharmacy: [facility]\\nPrevious medications: Duobri and Vytone topical cream \\nPrevious Treatment: IPL: 515, 15j, 10ms, 60% cooling\\n\\nFollow up \\nLOV 01/13/2020\\nPhotos taken\\n\\nPt comes in today for a 4 month follow up\\nPt states he has been using the Cerave MC daily and reapplying and has really helped from having the KP raised and bumpy.\\nPt states he has been using the compound creams prescribed to him from the [place] provider (mother's friend) off and on.\\nHe does not apply soap to the areas.\\n\\nDiscussed with pt:\\nI will give him a sample of Fabior today and I only want him to use a MM size to his arms twice a week at night \\nPt is to use the triamcinolone he has at home in the mornings after using the Fabior\\nDiscussed with pt If arms become irritated we will have him stop this regimen.\\nI would like to give fabior a chance; so for now I would ask for him to put the other two creams that were given to him in [place] aside for one month \\nPt stated understanding.\\n\\nFollow up 1 month\\n--------------------------------------------------------\\nPt is here for a follow up after using Duobri along with the Vytone cream.\\nHe didn’t find much relief with the Duobri and stopped using it\\nHis previous dermatologist prescribed him a compounded cream that contained SA with Hydroquinone and that seems to be keeping his KP under control along with the Vytone cream\\nPt is content with the results and feels KP is less bumpy\\nHe was also using OTC Cerave with SA which seems to be helping \\nLitanopris

## 2020-08-12 RX ORDER — PHARMACY COMPOUNDING ACCESSORY
EACH MISCELLANEOUS
Qty: 1 | Refills: 3 | Status: ERX

## 2020-09-14 ENCOUNTER — APPOINTMENT (RX ONLY)
Dept: URBAN - METROPOLITAN AREA CLINIC 77 | Facility: CLINIC | Age: 25
Setting detail: DERMATOLOGY
End: 2020-09-14

## 2020-09-14 DIAGNOSIS — Q828 OTHER SPECIFIED ANOMALIES OF SKIN: ICD-10-CM

## 2020-09-14 DIAGNOSIS — Q826 OTHER SPECIFIED ANOMALIES OF SKIN: ICD-10-CM

## 2020-09-14 DIAGNOSIS — B07.0 PLANTAR WART: ICD-10-CM

## 2020-09-14 DIAGNOSIS — L63.8 OTHER ALOPECIA AREATA: ICD-10-CM

## 2020-09-14 DIAGNOSIS — A63.0 ANOGENITAL (VENEREAL) WARTS: ICD-10-CM

## 2020-09-14 DIAGNOSIS — Q819 OTHER SPECIFIED ANOMALIES OF SKIN: ICD-10-CM

## 2020-09-14 PROBLEM — Q82.8 OTHER SPECIFIED CONGENITAL MALFORMATIONS OF SKIN: Status: ACTIVE | Noted: 2020-09-14

## 2020-09-14 PROCEDURE — 99213 OFFICE O/P EST LOW 20 MIN: CPT

## 2020-09-14 PROCEDURE — ? COUNSELING

## 2020-09-14 PROCEDURE — ? TREATMENT REGIMEN

## 2020-09-14 PROCEDURE — ? PRESCRIPTION

## 2020-09-14 RX ORDER — PHARMACY COMPOUNDING ACCESSORY
EACH MISCELLANEOUS
Qty: 1 | Refills: 3 | Status: CANCELLED
Stop reason: CLARIF

## 2020-09-14 ASSESSMENT — LOCATION SIMPLE DESCRIPTION DERM
LOCATION SIMPLE: RIGHT UPPER ARM
LOCATION SIMPLE: LEFT UPPER ARM
LOCATION SIMPLE: LEFT SCALP
LOCATION SIMPLE: SCALP
LOCATION SIMPLE: PENIS

## 2020-09-14 ASSESSMENT — LOCATION DETAILED DESCRIPTION DERM
LOCATION DETAILED: LEFT CENTRAL FRONTAL SCALP
LOCATION DETAILED: RIGHT DORSAL SHAFT OF PENIS
LOCATION DETAILED: LEFT PROXIMAL POSTERIOR UPPER ARM
LOCATION DETAILED: RIGHT PROXIMAL POSTERIOR UPPER ARM
LOCATION DETAILED: LEFT SUPERIOR PARIETAL SCALP
LOCATION DETAILED: LEFT DORSAL SHAFT OF PENIS

## 2020-09-14 ASSESSMENT — LOCATION ZONE DERM
LOCATION ZONE: ARM
LOCATION ZONE: PENIS
LOCATION ZONE: SCALP

## 2020-09-14 NOTE — PROCEDURE: TREATMENT REGIMEN
Detail Level: Zone
Plan: Location: Posterior scalp and left scalp.\\nPharmacy: [facility] \\nUSING: Compounded solution minoxidil and Latano (prescribed by a different provider)\\n\\n\\nPrevious treatments-\\n04/23/2019 1st injection \\n05/14/2019  2nd injection \\n06/14/2019  3rd injection \\n07/16/2019   4th injection \\n08/20/2019 5th injection -- LAST INJECTION \\n06/09/2020 5FU/K40 Injections \\n\\n\\n08/10/2020- 5FU/K40 INJECTION (1st injection) \\n09/14/2020- 5FU/K40 INJECTION (2nd injection)\\n\\nPatient is here for a one month follow up on hair loss \\nPatient has been getting injections done for hair loss and states he has noticed some improvement, however he has noticed a little bit more hair loss, he admits to being a little overwhelmed at work and has been under stress.\\nWill inject him again at today’s visit with 5FU/K40 to help with inflammation and irritation that he is currently experiencing.\\nDiscussed with him he should continue compounding medication daily and continue using topical that he has acquired from a family member twice a week versus once a week.\\nHe is to continue current regimen and follow up as instructed\\n\\n\\nPatient is to follow up in one month \\n————————————-\\nFollow up \\nLOV 02/24/2020\\nPhotos taken \\n\\nPt is here for a follow up appointment \\nPt stopped using the hair compound medication that was given to him in [place] \\nPt states the areas that were being treated with 5FU/K40 Injections have resolved \\nPt states there are three new spots that recently have appeared.\\n\\nDiscussed with pt: \\nReassured pt that now seeing the old ones going away and getting new ones is telling us that this hair loss was not tied to Accutane which I suspected from the beginning \\nEducated and had discussion with pt about Xeljanz which is Janus kinase (GUNNER) inhibitor that helps with inflammation\\nIt is currently going through trials for Alopecia Areata and it is used for psoriasis \\nI would like for him to go home and read about Xeljanz \\nToday I will inject 5FU/K40 to the new spots and he will come back in one month for continuation of care.\\nPt should continue the compounds that were given to him from the [place] dermatologist(mother's friend)\\nPt states understanding. \\n--------------------------------------------------------------------\\n2- \\n\\nPhoto taken \\n\\nPatient is here for a follow up \\nPatient states he is using a compounded medication that contains minoxidil and Latano Prescribed by a different dermatologist in [place] \\nPatient states he has noticed and felt significant hair growth \\n\\nDiscussed with patient that after examining his scalp it does appear to present significant hair growth \\nExpressed to patient that there is light at the end of the tunnel, patient also expressed he is very happy with how his hair has grown out \\nToday i will have patient continue using compounded solution since he is responding well on treatment \\nPhotos were taken to compare and presented to patient \\n\\n\\nFollow up: 2-3 months \\n——————\\nPREVIOUS VISIT \\n\\nPt is here to follow up for hair loss on the scalp\\nHe has had multiple ILK injections in which it seems to have helped reduce inflammation\\nThe circular patches are not visible today and hair growth is noticeable\\nToday Pt states he does not want to continue ILK injections and is concerned of atrophy from the injections\\nI reassured him that atrophy is temporary. \\nHis previous dermatologist started him on a compounded topical that contained minoxidil and Latano in which he is requesting a refill on. Will send to [facility]\\nFollow up: 6 weeks
Plan: Location: Genital region  \\nPREVIOUS Treatment: LN2\\nTreatment TODAY: LN2\\n\\n***PATIENT QUESTIONED IF HE CAN TRANSFER HPV DUE TO RECENTLY DATING SOMEONE NEW, I EXPLAINED EVERY ONE WHO HAS HAD SEXUAL INTERCOURSE SHOULD KNOW THE RISK OF THR TRANSMISSION OF HPV SO IF HE WOULD LIKE TO BE HONEST WITH HER THEN HE SHOULD HOWEVER IT IS NOT NECESSARY AS THIS IS NOT A STD LIKE GONORRHEA, CHLAMYDIA.***\\n\\nPatient is here for follow up \\nHe has been getting LN2 treatment done for HPV flares and states he has noticed improvement \\nUpon examination it does appear there is new lesions present, will treat with LN2 at today’s visit \\nPatient was instructed to continue to monitor the area and continue applying imiquimod on areas of concern, he is to follow up as instructed \\n\\nPatient is to follow up in one month \\n——————————————\\nFollow up\\nLOV 02/24/2020\\nNo photos Pt\\n\\nPt is here for a 4 month follow up\\nHe would like reassurance on some new areas that he noticed the past few weeks.\\nPt states Veregen has not helped him.\\nHe would like to know about auto -immune supplements.\\n\\nEducated pt about being healthy is number one and any immune inhibitors can help boost up, I am not against him trying \\nPt has a very hyperactive immune system and he will always be more of a dermatologist project \\nToday I will prescribe him imuquimod for him to try to the affected areas once nightly.\\nReiterated pt about the difference in how to eyeball a genital wart and his glands \\n\\nFollow up 1 month\\n------------------------------------------------------------------------------\\n\\n02/24/2020\\n\\n-- No photo per patient \\n\\nPatient is here for a follow up \\nPatient presents with 1 active wart located on the genital region \\nPatient states other warts that have been treated have stayed resolved \\n\\nDiscussed with patient that they are a common HPV viral infection that occurs during stress and immune system is unable to fight the virus.\\n TODAY i will treat active wart with cryotherapy that will allow immune system to fight off virus\\nDiscussed with patient if wart blister ups, then can use sterile needle to poke and let fluid drain\\nDiscussed with patient that he should leave skin attached to help provide a protective barrier while it is healing\\n\\nFollow up: 2-3 months \\n------------\\nPREVIOUS VISIT \\n\\nPt is here to follow up after having multiple cryotherapy treatments \\nThere are few genital warts today that we will continue to treat with LN2 and reassured him that he is improving \\nHe is doing the Gardasil 9 series injection and he is on the second treatment. \\nWill continue in 6 weeks to improvement
Samples Given: Duemarilynri
Plan: Location: B/L Arms, Chest, Back \\nPharmacy: [facility]\\nPrevious medications: Duobri and Vytone topical cream \\nPrevious Treatment: IPL: 515, 15j, 10ms, 60% cooling\\n\\n09/14/2020\\n\\nPatient is here for a one month follow up \\nPatient has been using CeraVe MC daily reapplying topical has really helped from having the KP raised and bumpy.\\nPatient has also been apply Fabior daily or as needed, he is to continue current regimen and follow up as instructed. \\nHe was provided with samples of Fabior at today’s visit.\\n\\n\\nPatient is to follow up in one month \\n—————————-\\nFollow up \\nLOV 01/13/2020\\nPhotos taken\\n\\nPt comes in today for a 4 month follow up\\nPt states he has been using the Cerave MC daily and reapplying and has really helped from having the KP raised and bumpy.\\nPt states he has been using the compound creams prescribed to him from the [place] provider (mother's friend) off and on.\\nHe does not apply soap to the areas.\\n\\nDiscussed with pt:\\nI will give him a sample of Fabior today and I only want him to use a MM size to his arms twice a week at night \\nPt is to use the triamcinolone he has at home in the mornings after using the Fabior\\nDiscussed with pt If arms become irritated we will have him stop this regimen.\\nI would like to give fabior a chance; so for now I would ask for him to put the other two creams that were given to him in [place] aside for one month \\nPt stated understanding.\\n\\nFollow up 1 month\\n--------------------------------------------------------\\nPt is here for a follow up after using Duobri along with the Vytone cream.\\nHe didn’t find much relief with the Duobri and stopped using it\\nHis previous dermatologist prescribed him a compounded cream that contained SA with Hydroquinone and that seems to be keeping his KP under control along with the Vytone cream\\nPt is content with the results and feels KP is less bumpy\\nHe was also using OTC Cerave with SA which seems to be helping \\nLitanopris
Plan: Location: right great toe, and right plantar \\nTreatment: LN2 treatment \\n\\nDiscussed with patient that they are a common HPV viral infection that occurs during stress and immune system is unable to fight the virus.\\nWill treat with cryotherapy today to allow immune system to fight off virus. \\nDiscussed with pt if wart blister ups, then can use sterile needle to poke and let fluid drain.\\nDiscussed with pt that they should leave skin attached to help provide a protective barrier while it is healing.\\n\\nDiscussed with pt if it does not disappear, then we my have to treat with Cidofovir anti viral injection at future appointment.\\nAlthough it is not FDA approved, is very effective. \\nDiscussed anticipated side effects of Cidofovir treatment that may include discomfort, burning, swelling, redness, and blistering.\\nF/u in 4 weeks

## 2020-10-19 ENCOUNTER — APPOINTMENT (RX ONLY)
Dept: URBAN - METROPOLITAN AREA CLINIC 77 | Facility: CLINIC | Age: 25
Setting detail: DERMATOLOGY
End: 2020-10-19

## 2020-10-19 DIAGNOSIS — Q819 OTHER SPECIFIED ANOMALIES OF SKIN: ICD-10-CM

## 2020-10-19 DIAGNOSIS — Q826 OTHER SPECIFIED ANOMALIES OF SKIN: ICD-10-CM

## 2020-10-19 DIAGNOSIS — B07.0 PLANTAR WART: ICD-10-CM

## 2020-10-19 DIAGNOSIS — Q828 OTHER SPECIFIED ANOMALIES OF SKIN: ICD-10-CM

## 2020-10-19 DIAGNOSIS — L63.8 OTHER ALOPECIA AREATA: ICD-10-CM

## 2020-10-19 DIAGNOSIS — A63.0 ANOGENITAL (VENEREAL) WARTS: ICD-10-CM

## 2020-10-19 PROBLEM — Q82.8 OTHER SPECIFIED CONGENITAL MALFORMATIONS OF SKIN: Status: ACTIVE | Noted: 2020-10-19

## 2020-10-19 PROCEDURE — 17110 DESTRUCTION B9 LES UP TO 14: CPT

## 2020-10-19 PROCEDURE — 99213 OFFICE O/P EST LOW 20 MIN: CPT | Mod: 25

## 2020-10-19 PROCEDURE — ? COUNSELING

## 2020-10-19 PROCEDURE — ? TREATMENT REGIMEN

## 2020-10-19 PROCEDURE — ? LIQUID NITROGEN

## 2020-10-19 ASSESSMENT — LOCATION DETAILED DESCRIPTION DERM
LOCATION DETAILED: LEFT DORSAL SHAFT OF PENIS
LOCATION DETAILED: LEFT SUPERIOR PARIETAL SCALP
LOCATION DETAILED: LEFT CENTRAL FRONTAL SCALP
LOCATION DETAILED: LEFT PROXIMAL POSTERIOR UPPER ARM
LOCATION DETAILED: RIGHT DORSAL SHAFT OF PENIS
LOCATION DETAILED: RIGHT PROXIMAL POSTERIOR UPPER ARM

## 2020-10-19 ASSESSMENT — LOCATION SIMPLE DESCRIPTION DERM
LOCATION SIMPLE: PENIS
LOCATION SIMPLE: RIGHT UPPER ARM
LOCATION SIMPLE: LEFT SCALP
LOCATION SIMPLE: LEFT UPPER ARM
LOCATION SIMPLE: SCALP

## 2020-10-19 ASSESSMENT — LOCATION ZONE DERM
LOCATION ZONE: ARM
LOCATION ZONE: SCALP
LOCATION ZONE: PENIS

## 2020-10-19 NOTE — PROCEDURE: LIQUID NITROGEN
Consent: The patient's consent was obtained including but not limited to risks of crusting, scabbing, blistering, scarring, darker or lighter pigmentary change, recurrence, incomplete removal and infection.
Post-Care Instructions: I reviewed with the patient in detail post-care instructions. Patient is to wear sunprotection, and avoid picking at any of the treated lesions. Pt may apply Vaseline to crusted or scabbing areas.
Include Z78.9 (Other Specified Conditions Influencing Health Status) As An Associated Diagnosis?: No
Number Of Freeze-Thaw Cycles: 3 freeze-thaw cycles
Medical Necessity Clause: This procedure was medically necessary because the lesions that were treated were:
Detail Level: Detailed
Medical Necessity Information: It is in your best interest to select a reason for this procedure from the list below. All of these items fulfill various CMS LCD requirements except the new and changing color options.

## 2020-10-19 NOTE — PROCEDURE: TREATMENT REGIMEN
Detail Level: Zone
Plan: Location: Posterior scalp and left scalp.\\nPharmacy: [facility] \\nUSING: Compounded solution minoxidil and Latano (prescribed by a different provider)\\n\\n\\nPrevious treatments-\\n04/23/2019 1st injection \\n05/14/2019  2nd injection \\n06/14/2019  3rd injection \\n07/16/2019   4th injection \\n08/20/2019 5th injection -- LAST INJECTION \\n06/09/2020 5FU/K40 Injections \\n\\n\\n08/10/2020- 5FU/K40 INJECTION (1st injection) \\n09/14/2020- 5FU/K40 INJECTION (2nd injection)\\n10/19/2020- 5FU/K40 INJECTION (2nd injection)\\n\\n\\nPt presents today for a follow up on alopecia.\\nPt states that he continues to see improvement with the 5fu\kenalog 40 injections.\\nHe states that when some spots of his scalp start seeing improvement he feels like a new area will start to lose hair.\\nHe states that his main concern is his genital warts, and states that he wants to get that under control before he starts discussing any biologics.\\nToday I will inject 5FU/K40 to the new spots and he will come back in one month for continuation of care.\\nDiscussed with pt that his alopecia is improving, but he still continues to have areas that aren’t improving fully.\\nDiscussed with pt that he should have a  discussion with his parents about the biologic Xeljanz, and also to start reading up on it.\\nHe’s to continue with compounding medication and follow up as instructed.\\n\\n\\nPatient is to follow up in 6 weeks
Plan: Location: Genital region  \\nPREVIOUS Treatment: LN2\\nTreatment TODAY: LN2\\n\\n***PATIENT QUESTIONED IF HE CAN TRANSFER HPV DUE TO RECENTLY DATING SOMEONE NEW, I EXPLAINED EVERY ONE WHO HAS HAD SEXUAL INTERCOURSE SHOULD KNOW THE RISK OF THR TRANSMISSION OF HPV SO IF HE WOULD LIKE TO BE HONEST WITH HER THEN HE SHOULD HOWEVER IT IS NOT NECESSARY AS THIS IS NOT A STD LIKE GONORRHEA, CHLAMYDIA.***\\n\\nPatient is here for follow up \\nHe has been getting LN2 treatment done for HPV flares and states he has noticed improvement \\nUpon examination it does appear there is new lesions present, will treat with LN2 at today’s visit \\nPatient was instructed to continue to monitor the area and continue applying imiquimod on areas of concern, he is to follow up as instructed \\n\\nPatient is to follow up in one month
Plan: Location: right great toe, and right plantar \\nTreatment: LN2 treatment \\n\\nDiscussed with patient that they are a common HPV viral infection that occurs during stress and immune system is unable to fight the virus.\\nWill treat with cryotherapy today to allow immune system to fight off virus. \\nDiscussed with pt if wart blister ups, then can use sterile needle to poke and let fluid drain.\\nDiscussed with pt that they should leave skin attached to help provide a protective barrier while it is healing.\\n\\nDiscussed with pt if it does not disappear, then we my have to treat with Cidofovir anti viral injection at future appointment.\\nAlthough it is not FDA approved, is very effective. \\nDiscussed anticipated side effects of Cidofovir treatment that may include discomfort, burning, swelling, redness, and blistering.\\nF/u in 4 weeks
Samples Given: Duemarilynri
Plan: Location: B/L Arms, Chest, Back \\nPharmacy: [facility]\\nPrevious medications: Duobri and Vytone topical cream \\nPrevious Treatment: IPL: 515, 15j, 10ms, 60% cooling\\n\\n10/19/2020\\n\\nPatient is here for a one month follow up.\\nPatient has been using CeraVe MC daily reapplying topical has really helped from having the KP raised and bumpy.\\nPatient has also been apply Fabior daily or as needed, he is to continue current regimen and follow up as instructed. \\nHe was provided with samples of Arazlo at today’s visit.\\n\\n\\nPatient is to follow up in one month

## 2020-11-23 ENCOUNTER — APPOINTMENT (RX ONLY)
Dept: URBAN - METROPOLITAN AREA CLINIC 77 | Facility: CLINIC | Age: 25
Setting detail: DERMATOLOGY
End: 2020-11-23

## 2020-11-23 DIAGNOSIS — Q819 OTHER SPECIFIED ANOMALIES OF SKIN: ICD-10-CM

## 2020-11-23 DIAGNOSIS — Q828 OTHER SPECIFIED ANOMALIES OF SKIN: ICD-10-CM

## 2020-11-23 DIAGNOSIS — B07.0 PLANTAR WART: ICD-10-CM

## 2020-11-23 DIAGNOSIS — L63.8 OTHER ALOPECIA AREATA: ICD-10-CM

## 2020-11-23 DIAGNOSIS — A63.0 ANOGENITAL (VENEREAL) WARTS: ICD-10-CM

## 2020-11-23 DIAGNOSIS — Q826 OTHER SPECIFIED ANOMALIES OF SKIN: ICD-10-CM

## 2020-11-23 DIAGNOSIS — B35.3 TINEA PEDIS: ICD-10-CM

## 2020-11-23 PROBLEM — Q82.8 OTHER SPECIFIED CONGENITAL MALFORMATIONS OF SKIN: Status: ACTIVE | Noted: 2020-11-23

## 2020-11-23 PROCEDURE — ? TREATMENT REGIMEN

## 2020-11-23 PROCEDURE — ? COUNSELING

## 2020-11-23 PROCEDURE — 99213 OFFICE O/P EST LOW 20 MIN: CPT | Mod: 25

## 2020-11-23 PROCEDURE — ? PRESCRIPTION

## 2020-11-23 PROCEDURE — 17110 DESTRUCTION B9 LES UP TO 14: CPT

## 2020-11-23 PROCEDURE — ? LIQUID NITROGEN

## 2020-11-23 RX ORDER — NAFTIFINE HYDROCHLORIDE 2 G/100G
GEL TOPICAL
Qty: 1 | Refills: 1 | Status: CANCELLED
Stop reason: CLARIF

## 2020-11-23 ASSESSMENT — LOCATION ZONE DERM
LOCATION ZONE: SCALP
LOCATION ZONE: ARM
LOCATION ZONE: PENIS
LOCATION ZONE: FEET

## 2020-11-23 ASSESSMENT — LOCATION SIMPLE DESCRIPTION DERM
LOCATION SIMPLE: PENIS
LOCATION SIMPLE: LEFT UPPER ARM
LOCATION SIMPLE: SCALP
LOCATION SIMPLE: RIGHT UPPER ARM
LOCATION SIMPLE: LEFT SCALP
LOCATION SIMPLE: RIGHT PLANTAR SURFACE

## 2020-11-23 ASSESSMENT — LOCATION DETAILED DESCRIPTION DERM
LOCATION DETAILED: LEFT PROXIMAL POSTERIOR UPPER ARM
LOCATION DETAILED: LEFT CENTRAL FRONTAL SCALP
LOCATION DETAILED: LEFT DORSAL SHAFT OF PENIS
LOCATION DETAILED: RIGHT MEDIAL PLANTAR MIDFOOT
LOCATION DETAILED: RIGHT PROXIMAL POSTERIOR UPPER ARM
LOCATION DETAILED: LEFT SUPERIOR PARIETAL SCALP
LOCATION DETAILED: RIGHT DORSAL SHAFT OF PENIS

## 2020-11-23 NOTE — PROCEDURE: TREATMENT REGIMEN
Detail Level: Zone
Plan: Location: Posterior scalp and left scalp.\\nPharmacy: [facility] \\nUSING: Compounded solution minoxidil and Latano (prescribed by a different provider)\\n\\n\\nPrevious treatments-\\n04/23/2019 1st injection \\n05/14/2019  2nd injection \\n06/14/2019  3rd injection \\n07/16/2019   4th injection \\n08/20/2019 5th injection -- LAST INJECTION \\n06/09/2020 5FU/K40 Injections \\n\\n\\n08/10/2020- 5FU/K40 INJECTION (1st injection) \\n09/14/2020- 5FU/K40 INJECTION (2nd injection)\\n10/19/2020- 5FU/K40 INJECTION (2nd injection)\\n11/23/2020- 5FU/K40 INJECTION ( 2nd injection)\\n\\nPt presents today for a follow up on alopecia.\\nPt states that he noticed new hair loss spots on his scalp.\\nHe States that he would like to get more 5fu\kenalog 40 injections.\\nHe states that he has been taking mushroom extracts to help with hair growth, and genital warts. \\nHe states that his main concern is his genital warts, and states that he wants to get that under control before he starts discussing any biologics.\\n\\nToday I will inject 5FU/K40 to the new spots and he will come back in one month for continuation of care.\\nDiscussed with pt that  he still continues to have areas that aren’t improving fully.\\nDiscussed with pt that he should have a  discussion with his parents about the biologic Xeljanz, and also to start reading up on it.\\nHe’s to continue with compounding medication and follow up as instructed.\\nDiscussed with pt that he should discontinue use of Mushroom extracts.\\n\\nPatient is to follow up in 6 weeks
Plan: Location: Genital region  \\nPREVIOUS Treatment: LN2\\nTreatment TODAY: LN2\\n\\n***PATIENT QUESTIONED IF HE CAN TRANSFER HPV DUE TO RECENTLY DATING SOMEONE NEW, I EXPLAINED EVERY ONE WHO HAS HAD SEXUAL INTERCOURSE SHOULD KNOW THE RISK OF THR TRANSMISSION OF HPV SO IF HE WOULD LIKE TO BE HONEST WITH HER THEN HE SHOULD HOWEVER IT IS NOT NECESSARY AS THIS IS NOT A STD LIKE GONORRHEA, CHLAMYDIA.***\\n\\nPatient is here for follow up \\nHe has been getting LN2 treatment done for HPV flares and states he has noticed improvement \\nUpon examination it does appear there is new lesions present, will treat with LN2 at today’s visit \\nPatient was instructed to continue to monitor the area.\\nDiscussed with pt to start applying imiquimod once a week.\\nDiscussed with pt to start using Cimetidine to help with verrucas.\\n\\nPatient is to follow up in 6 weeks.
Samples Given: Duemarilynri
Plan: Location: B/L Arms, Chest, Back \\nPharmacy: [facility]\\nPrevious medications: Duobri and Vytone topical cream \\nPrevious Treatment: IPL: 515, 15j, 10ms, 60% cooling\\n\\n11/19/2020\\n\\nPatient is here for a one month follow up.\\nPatient has been using CeraVe MC daily reapplying topical has really helped from having the KP raised and bumpy.\\nPatient has been using the samples of  Arazlo daily or as needed, and mentions that it cleared up his kp.\\nHe mentions that he liked the samples of Arazlo and would like to get a prescription today.\\n\\nDiscussed with patient that his arms have improved significantly since starting treatment regimen.\\nToday, I will be prescribing Arazlo.\\n\\n\\nPatient is to follow up in one month

## 2021-01-04 ENCOUNTER — APPOINTMENT (RX ONLY)
Dept: URBAN - METROPOLITAN AREA CLINIC 77 | Facility: CLINIC | Age: 26
Setting detail: DERMATOLOGY
End: 2021-01-04

## 2021-01-04 DIAGNOSIS — Q826 OTHER SPECIFIED ANOMALIES OF SKIN: ICD-10-CM

## 2021-01-04 DIAGNOSIS — Q828 OTHER SPECIFIED ANOMALIES OF SKIN: ICD-10-CM

## 2021-01-04 DIAGNOSIS — Q819 OTHER SPECIFIED ANOMALIES OF SKIN: ICD-10-CM

## 2021-01-04 DIAGNOSIS — A63.0 ANOGENITAL (VENEREAL) WARTS: ICD-10-CM

## 2021-01-04 DIAGNOSIS — L63.8 OTHER ALOPECIA AREATA: ICD-10-CM

## 2021-01-04 PROBLEM — Q82.8 OTHER SPECIFIED CONGENITAL MALFORMATIONS OF SKIN: Status: ACTIVE | Noted: 2021-01-04

## 2021-01-04 PROCEDURE — 99214 OFFICE O/P EST MOD 30 MIN: CPT | Mod: 25

## 2021-01-04 PROCEDURE — ? PRESCRIPTION

## 2021-01-04 PROCEDURE — ? INTRALESIONAL KENALOG

## 2021-01-04 PROCEDURE — ? COUNSELING

## 2021-01-04 PROCEDURE — ? TREATMENT REGIMEN

## 2021-01-04 PROCEDURE — ? LIQUID NITROGEN

## 2021-01-04 RX ORDER — TAZAROTENE 0.45 MG/G
LOTION TOPICAL
Qty: 1 | Refills: 3 | Status: ERX | COMMUNITY
Start: 2021-01-04

## 2021-01-04 RX ADMIN — TAZAROTENE: 0.45 LOTION TOPICAL at 00:00

## 2021-01-04 ASSESSMENT — LOCATION SIMPLE DESCRIPTION DERM
LOCATION SIMPLE: PENIS
LOCATION SIMPLE: LEFT UPPER ARM
LOCATION SIMPLE: RIGHT UPPER ARM
LOCATION SIMPLE: SCALP
LOCATION SIMPLE: LEFT SCALP

## 2021-01-04 ASSESSMENT — LOCATION DETAILED DESCRIPTION DERM
LOCATION DETAILED: RIGHT PROXIMAL POSTERIOR UPPER ARM
LOCATION DETAILED: LEFT CENTRAL PARIETAL SCALP
LOCATION DETAILED: LEFT SUPERIOR PARIETAL SCALP
LOCATION DETAILED: LEFT PROXIMAL POSTERIOR UPPER ARM
LOCATION DETAILED: LEFT CENTRAL FRONTAL SCALP
LOCATION DETAILED: LEFT DISTAL POSTERIOR UPPER ARM
LOCATION DETAILED: LEFT DORSAL SHAFT OF PENIS
LOCATION DETAILED: RIGHT DORSAL SHAFT OF PENIS

## 2021-01-04 ASSESSMENT — LOCATION ZONE DERM
LOCATION ZONE: SCALP
LOCATION ZONE: PENIS
LOCATION ZONE: ARM

## 2021-01-04 ASSESSMENT — SEVERITY ASSESSMENT: SEVERITY: MILD TO MODERATE

## 2021-01-04 NOTE — PROCEDURE: TREATMENT REGIMEN
Detail Level: Zone
Plan: Location: Posterior scalp and left scalp.\\nPharmacy: [facility] \\nContinue using: Compounded solution minoxidil and Latano (prescribed by a different provider)\\nToday’s treatment: 1cc of 5FU/K40\\n\\n\\nPrevious treatments-\\n04/23/2019 1st injection \\n05/14/2019  2nd injection \\n06/14/2019  3rd injection \\n07/16/2019   4th injection \\n08/20/2019 5th injection -- LAST INJECTION \\n06/09/2020 5FU/K40 Injections \\n08/10/2020- 5FU/K40 INJECTION (1st injection) \\n09/14/2020- 5FU/K40 INJECTION (2nd injection)\\n10/19/2020- 5FU/K40 INJECTION (2nd injection)\\n11/23/2020- 5FU/K40 INJECTION ( 2nd injection)\\n1/4/2021 - 5FU/K40 injection \\n\\nPt presents today for a follow up on alopecia areata that continues to be problematic for over a year\\nWe have been treating the circular bald patches with 5FU/K40\\nPhotos were compared at today’s visit and reassured him that there is hair growth present \\n\\nToday I will inject 5FU/K40 to the new spots and he will come back in one month for continuation of care.\\nDiscussed with pt that  he still continues to have areas that aren’t improving fully.\\nDiscussed with pt that he should have a  discussion with his parents about the biologic Xeljanz, and also to start reading up on it.\\nHe’s to continue with compounding medication and follow up as instructed.\\n\\nPatient is to follow up in 6 weeks
Plan: Location: Genital region  \\nPREVIOUS Treatment: LN2\\nTreatment TODAY: LN2\\nPreviously used: Veregen ointment \\n\\n***PATIENT QUESTIONED IF HE CAN TRANSFER HPV DUE TO RECENTLY DATING SOMEONE NEW, I EXPLAINED EVERY ONE WHO HAS HAD SEXUAL INTERCOURSE SHOULD KNOW THE RISK OF THR TRANSMISSION OF HPV SO IF HE WOULD LIKE TO BE HONEST WITH HER THEN HE SHOULD HOWEVER IT IS NOT NECESSARY AS THIS IS NOT A STD LIKE GONORRHEA, CHLAMYDIA.***\\n\\nPatient is here for follow up and states he hasn’t noticed any new warts \\nHe mentioned that he had been using Veregen ointment but finds it very irritating and decided to discontinue \\nRe-evaluated the area today and reassured him that there are no new growths and where we last treated are scars \\nHowever, near the groove of his penis, I am suspicious of one growth in which we will treat with cryotherapy \\nInformed him that he does have pearly penile papules which are glands that are common and normal \\nPatient is to follow up in 6 weeks.
Samples Given: Duemarilynri
Plan: Location: B/L Arms, Chest, Back \\nPharmacy: [facility]\\nPrevious medications: Duobri and Vytone topical cream, Fabior 0.1% foam  \\nPrevious Treatment: IPL: 515, 15j, 10ms, 60% cooling\\nPrescribed today: Arazlo lotion \\n\\nPatient is here for a one month follow up.\\nPatient has been using CeraVe MC daily reapplying topical has really helped from having the KP raised and bumpy.\\nPatient has been using the samples of  Arazlo daily or as needed, and mentions that it cleared up his KP\\nHe mentions that he liked the samples of Arazlo and would like to get a prescription today.\\nHe states he did not receive the medication at his last visit so he had been using the Fabior foam\\nHe mentioned that the Fabior foam can be irritating and strong, but has stabilized his KP\\n\\nDiscussed with patient that his arms have improved significantly since starting treatment regimen.\\nToday, I will be prescribing Arazlo. \\n\\nPatient is to follow up in 6 weeks to re-evaluate

## 2021-01-04 NOTE — PROCEDURE: INTRALESIONAL KENALOG
Detail Level: Zone
Medical Necessity Clause: This procedure was medically necessary because the lesions that were treated were:
Include Z78.9 (Other Specified Conditions Influencing Health Status) As An Associated Diagnosis?: No
Total Volume Injected (Ccs- Only Use Numbers And Decimals): 1
Kenalog Preparation: Kenalog with 5-fluorouracil
Concentration Of Solution Injected (Mg/Ml): 20.0
X Size Of Lesion In Cm (Optional): 0
Consent: The risks of atrophy were reviewed with the patient.

## 2021-06-15 ENCOUNTER — RX ONLY (OUTPATIENT)
Age: 26
Setting detail: RX ONLY
End: 2021-06-15

## 2021-06-15 RX ORDER — TAZAROTENE 0.45 MG/G
LOTION TOPICAL
Qty: 1 | Refills: 3 | Status: ERX

## 2023-09-12 NOTE — HPI: RASH (SEBORRHEIC DERMATITIS)
How Severe Is It?: mild
Is This A New Presentation, Or A Follow-Up?: Rash
Aklief Pregnancy And Lactation Text: It is unknown if this medication is safe to use during pregnancy.  It is unknown if this medication is excreted in breast milk.  Breastfeeding women should use the topical cream on the smallest area of the skin for the shortest time needed while breastfeeding.  Do not apply to nipple and areola.

## 2024-08-12 ENCOUNTER — APPOINTMENT (RX ONLY)
Dept: URBAN - METROPOLITAN AREA CLINIC 185 | Facility: CLINIC | Age: 29
Setting detail: DERMATOLOGY
End: 2024-08-12

## 2024-08-12 DIAGNOSIS — L63.8 OTHER ALOPECIA AREATA: ICD-10-CM

## 2024-08-12 DIAGNOSIS — Q828 OTHER SPECIFIED ANOMALIES OF SKIN: ICD-10-CM

## 2024-08-12 DIAGNOSIS — Q826 OTHER SPECIFIED ANOMALIES OF SKIN: ICD-10-CM

## 2024-08-12 DIAGNOSIS — Q819 OTHER SPECIFIED ANOMALIES OF SKIN: ICD-10-CM

## 2024-08-12 DIAGNOSIS — L70.0 ACNE VULGARIS: ICD-10-CM

## 2024-08-12 PROBLEM — Q82.8 OTHER SPECIFIED CONGENITAL MALFORMATIONS OF SKIN: Status: ACTIVE | Noted: 2024-08-12

## 2024-08-12 PROCEDURE — 11900 INJECT SKIN LESIONS </W 7: CPT

## 2024-08-12 PROCEDURE — ? INTRALESIONAL KENALOG

## 2024-08-12 PROCEDURE — 99203 OFFICE O/P NEW LOW 30 MIN: CPT | Mod: 25

## 2024-08-12 PROCEDURE — ? COUNSELING

## 2024-08-12 PROCEDURE — ? PRESCRIPTION MEDICATION MANAGEMENT

## 2024-08-12 ASSESSMENT — LOCATION DETAILED DESCRIPTION DERM
LOCATION DETAILED: RIGHT DISTAL POSTERIOR UPPER ARM
LOCATION DETAILED: LEFT PROXIMAL POSTERIOR UPPER ARM
LOCATION DETAILED: MID-OCCIPITAL SCALP

## 2024-08-12 ASSESSMENT — LOCATION ZONE DERM
LOCATION ZONE: SCALP
LOCATION ZONE: ARM

## 2024-08-12 ASSESSMENT — LOCATION SIMPLE DESCRIPTION DERM
LOCATION SIMPLE: LEFT UPPER ARM
LOCATION SIMPLE: RIGHT UPPER ARM
LOCATION SIMPLE: POSTERIOR SCALP

## 2024-08-12 NOTE — HPI: HAIR LOSS
How Did The Hair Loss Occur?: gradual in onset
How Severe Is Your Hair Loss?: moderate
Additional History: Patient was seeing  for previous injections of 5 fu and kenalog

## 2024-08-12 NOTE — PROCEDURE: PRESCRIPTION MEDICATION MANAGEMENT
Detail Level: Detailed
Plan: Uradin lotion QD - BID W EXPL\\nor contin Cerave SA lot w expl\\nMild soaps\\nDisc contrib factors, px, poss retinoid
Render In Strict Bullet Format?: No

## 2024-08-12 NOTE — PROCEDURE: INTRALESIONAL KENALOG
How Many Mls Were Removed From The 40 Mg/Ml (5ml) Vial When Preparing The Injectable Solution?: 0
Include Z78.9 (Other Specified Conditions Influencing Health Status) As An Associated Diagnosis?: No
Validate Note Data When Using Inventory: Yes
Consent: The risks of atrophy were reviewed with the patient.
Which Kenalog Vial Was Used?: Kenalog 10 mg/ml (5 ml vial)
Lot # For Kenalog (Optional): [id number]
Administered By (Optional): ELLEN
Kenalog Preparation: Kenalog
Kenalog Type Of Vial: Multiple Dose
Concentration Of Kenalog Solution Injected (Mg/Ml): 10.0
How Many Mls Were Removed From The 10 Mg/Ml (5ml) Vial When Preparing The Injectable Solution?: 0.8
Expiration Date For Kenalog (Optional): 02/2026
Total Volume (Ccs): 0.7
Medical Necessity Clause: This procedure was medically necessary because the lesions that were treated were:
Detail Level: Detailed

## 2024-08-12 NOTE — PROCEDURE: COUNSELING
Detail Level: Detailed
Winlevi Pregnancy And Lactation Text: This medication is considered safe during pregnancy and breastfeeding.
Topical Retinoid counseling:  Patient advised to apply a pea-sized amount only at bedtime and wait 30 minutes after washing their face before applying.  If too drying, patient may add a non-comedogenic moisturizer. The patient verbalized understanding of the proper use and possible adverse effects of retinoids.  All of the patient's questions and concerns were addressed.
Doxycycline Counseling:  Patient counseled regarding possible photosensitivity and increased risk for sunburn.  Patient instructed to avoid sunlight, if possible.  When exposed to sunlight, patients should wear protective clothing, sunglasses, and sunscreen.  The patient was instructed to call the office immediately if the following severe adverse effects occur:  hearing changes, easy bruising/bleeding, severe headache, or vision changes.  The patient verbalized understanding of the proper use and possible adverse effects of doxycycline.  All of the patient's questions and concerns were addressed.
Benzoyl Peroxide Counseling: Patient counseled that medicine may cause skin irritation and bleach clothing.  In the event of skin irritation, the patient was advised to reduce the amount of the drug applied or use it less frequently.   The patient verbalized understanding of the proper use and possible adverse effects of benzoyl peroxide.  All of the patient's questions and concerns were addressed.
Sarecycline Pregnancy And Lactation Text: This medication is Pregnancy Category D and not consider safe during pregnancy. It is also excreted in breast milk.
Dapsone Counseling: I discussed with the patient the risks of dapsone including but not limited to hemolytic anemia, agranulocytosis, rashes, methemoglobinemia, kidney failure, peripheral neuropathy, headaches, GI upset, and liver toxicity.  Patients who start dapsone require monitoring including baseline LFTs and weekly CBCs for the first month, then every month thereafter.  The patient verbalized understanding of the proper use and possible adverse effects of dapsone.  All of the patient's questions and concerns were addressed.
Azelaic Acid Counseling: Patient counseled that medicine may cause skin irritation and to avoid applying near the eyes.  In the event of skin irritation, the patient was advised to reduce the amount of the drug applied or use it less frequently.   The patient verbalized understanding of the proper use and possible adverse effects of azelaic acid.  All of the patient's questions and concerns were addressed.
High Dose Vitamin A Pregnancy And Lactation Text: High dose vitamin A therapy is contraindicated during pregnancy and breast feeding.
Topical Sulfur Applications Pregnancy And Lactation Text: This medication is Pregnancy Category C and has an unknown safety profile during pregnancy. It is unknown if this topical medication is excreted in breast milk.
Use Enhanced Medication Counseling?: No
Topical Clindamycin Pregnancy And Lactation Text: This medication is Pregnancy Category B and is considered safe during pregnancy. It is unknown if it is excreted in breast milk.
Aklief counseling:  Patient advised to apply a pea-sized amount only at bedtime and wait 30 minutes after washing their face before applying.  If too drying, patient may add a non-comedogenic moisturizer.  The most commonly reported side effects including irritation, redness, scaling, dryness, stinging, burning, itching, and increased risk of sunburn.  The patient verbalized understanding of the proper use and possible adverse effects of retinoids.  All of the patient's questions and concerns were addressed.
Birth Control Pills Counseling: Birth Control Pill Counseling: I discussed with the patient the potential side effects of OCPs including but not limited to increased risk of stroke, heart attack, thrombophlebitis, deep venous thrombosis, hepatic adenomas, breast changes, GI upset, headaches, and depression.  The patient verbalized understanding of the proper use and possible adverse effects of OCPs. All of the patient's questions and concerns were addressed.
Isotretinoin Pregnancy And Lactation Text: This medication is Pregnancy Category X and is considered extremely dangerous during pregnancy. It is unknown if it is excreted in breast milk.
Bactrim Counseling:  I discussed with the patient the risks of sulfa antibiotics including but not limited to GI upset, allergic reaction, drug rash, diarrhea, dizziness, photosensitivity, and yeast infections.  Rarely, more serious reactions can occur including but not limited to aplastic anemia, agranulocytosis, methemoglobinemia, blood dyscrasias, liver or kidney failure, lung infiltrates or desquamative/blistering drug rashes.
Azithromycin Counseling:  I discussed with the patient the risks of azithromycin including but not limited to GI upset, allergic reaction, drug rash, diarrhea, and yeast infections.
Tetracycline Counseling: Patient counseled regarding possible photosensitivity and increased risk for sunburn.  Patient instructed to avoid sunlight, if possible.  When exposed to sunlight, patients should wear protective clothing, sunglasses, and sunscreen.  The patient was instructed to call the office immediately if the following severe adverse effects occur:  hearing changes, easy bruising/bleeding, severe headache, or vision changes.  The patient verbalized understanding of the proper use and possible adverse effects of tetracycline.  All of the patient's questions and concerns were addressed. Patient understands to avoid pregnancy while on therapy due to potential birth defects.
Erythromycin Pregnancy And Lactation Text: This medication is Pregnancy Category B and is considered safe during pregnancy. It is also excreted in breast milk.
Tazorac Pregnancy And Lactation Text: This medication is not safe during pregnancy. It is unknown if this medication is excreted in breast milk.
Topical Retinoid Pregnancy And Lactation Text: This medication is Pregnancy Category C. It is unknown if this medication is excreted in breast milk.
Spironolactone Counseling: Patient advised regarding risks of diarrhea, abdominal pain, hyperkalemia, birth defects (for female patients), liver toxicity and renal toxicity. The patient may need blood work to monitor liver and kidney function and potassium levels while on therapy. The patient verbalized understanding of the proper use and possible adverse effects of spironolactone.  All of the patient's questions and concerns were addressed.
Benzoyl Peroxide Pregnancy And Lactation Text: This medication is Pregnancy Category C. It is unknown if benzoyl peroxide is excreted in breast milk.
Doxycycline Pregnancy And Lactation Text: This medication is Pregnancy Category D and not consider safe during pregnancy. It is also excreted in breast milk but is considered safe for shorter treatment courses.
Dapsone Pregnancy And Lactation Text: This medication is Pregnancy Category C and is not considered safe during pregnancy or breast feeding.
Sarecycline Counseling: Patient advised regarding possible photosensitivity and discoloration of the teeth, skin, lips, tongue and gums.  Patient instructed to avoid sunlight, if possible.  When exposed to sunlight, patients should wear protective clothing, sunglasses, and sunscreen.  The patient was instructed to call the office immediately if the following severe adverse effects occur:  hearing changes, easy bruising/bleeding, severe headache, or vision changes.  The patient verbalized understanding of the proper use and possible adverse effects of sarecycline.  All of the patient's questions and concerns were addressed.
Birth Control Pills Pregnancy And Lactation Text: This medication should be avoided if pregnant and for the first 30 days post-partum.
Winlevi Counseling:  I discussed with the patient the risks of topical clascoterone including but not limited to erythema, scaling, itching, and stinging. Patient voiced their understanding.
Minocycline Counseling: Patient advised regarding possible photosensitivity and discoloration of the teeth, skin, lips, tongue and gums.  Patient instructed to avoid sunlight, if possible.  When exposed to sunlight, patients should wear protective clothing, sunglasses, and sunscreen.  The patient was instructed to call the office immediately if the following severe adverse effects occur:  hearing changes, easy bruising/bleeding, severe headache, or vision changes.  The patient verbalized understanding of the proper use and possible adverse effects of minocycline.  All of the patient's questions and concerns were addressed.
Bactrim Pregnancy And Lactation Text: This medication is Pregnancy Category D and is known to cause fetal risk.  It is also excreted in breast milk.
Patient Specific Counseling (Will Not Stick From Patient To Patient): HX ACCUT ~2018\\nDISC +/- OF CONTINUED USE OF BPO CL\\nCONSIDER OTC HC CR BID X FEW DAYS TO AREAS OF IRRIT/ERYTHEMA\\nCONSIDER IL TAC PRN CYSTS ESPEC BEFORE WEDDING
Topical Sulfur Applications Counseling: Topical Sulfur Counseling: Patient counseled that this medication may cause skin irritation or allergic reactions.  In the event of skin irritation, the patient was advised to reduce the amount of the drug applied or use it less frequently.   The patient verbalized understanding of the proper use and possible adverse effects of topical sulfur application.  All of the patient's questions and concerns were addressed.
Azelaic Acid Pregnancy And Lactation Text: This medication is considered safe during pregnancy and breast feeding.
Topical Clindamycin Counseling: Patient counseled that this medication may cause skin irritation or allergic reactions.  In the event of skin irritation, the patient was advised to reduce the amount of the drug applied or use it less frequently.   The patient verbalized understanding of the proper use and possible adverse effects of clindamycin.  All of the patient's questions and concerns were addressed.
Aklief Pregnancy And Lactation Text: It is unknown if this medication is safe to use during pregnancy.  It is unknown if this medication is excreted in breast milk.  Breastfeeding women should use the topical cream on the smallest area of the skin for the shortest time needed while breastfeeding.  Do not apply to nipple and areola.
High Dose Vitamin A Counseling: Side effects reviewed, pt to contact office should one occur.
Azithromycin Pregnancy And Lactation Text: This medication is considered safe during pregnancy and is also secreted in breast milk.
Spironolactone Pregnancy And Lactation Text: This medication can cause feminization of the male fetus and should be avoided during pregnancy. The active metabolite is also found in breast milk.
Detail Level: Zone
Isotretinoin Counseling: Patient should get monthly blood tests, not donate blood, not drive at night if vision affected, not share medication, and not undergo elective surgery for 6 months after tx completed. Side effects reviewed, pt to contact office should one occur.
Erythromycin Counseling:  I discussed with the patient the risks of erythromycin including but not limited to GI upset, allergic reaction, drug rash, diarrhea, increase in liver enzymes, and yeast infections.
Tazorac Counseling:  Patient advised that medication is irritating and drying.  Patient may need to apply sparingly and wash off after an hour before eventually leaving it on overnight.  The patient verbalized understanding of the proper use and possible adverse effects of tazorac.  All of the patient's questions and concerns were addressed.

## 2024-09-09 ENCOUNTER — APPOINTMENT (RX ONLY)
Dept: URBAN - METROPOLITAN AREA CLINIC 185 | Facility: CLINIC | Age: 29
Setting detail: DERMATOLOGY
End: 2024-09-09

## 2024-09-09 DIAGNOSIS — L63.8 OTHER ALOPECIA AREATA: ICD-10-CM

## 2024-09-09 PROCEDURE — ? COUNSELING

## 2024-09-09 PROCEDURE — ? INTRALESIONAL KENALOG

## 2024-09-09 PROCEDURE — 11900 INJECT SKIN LESIONS </W 7: CPT

## 2024-09-09 ASSESSMENT — LOCATION ZONE DERM: LOCATION ZONE: SCALP

## 2024-09-09 ASSESSMENT — LOCATION SIMPLE DESCRIPTION DERM: LOCATION SIMPLE: POSTERIOR SCALP

## 2024-09-09 ASSESSMENT — LOCATION DETAILED DESCRIPTION DERM: LOCATION DETAILED: MID-OCCIPITAL SCALP

## 2024-09-09 NOTE — PROCEDURE: INTRALESIONAL KENALOG
How Many Mls Were Removed From The 40 Mg/Ml (5ml) Vial When Preparing The Injectable Solution?: 0
Include Z78.9 (Other Specified Conditions Influencing Health Status) As An Associated Diagnosis?: No
Validate Note Data When Using Inventory: Yes
Consent: The risks of atrophy were reviewed with the patient.
Which Kenalog Vial Was Used?: Kenalog 10 mg/ml (5 ml vial)
Lot # For Kenalog (Optional): [id number]
Administered By (Optional): ELLEN
Kenalog Preparation: Kenalog
Kenalog Type Of Vial: Multiple Dose
Concentration Of Kenalog Solution Injected (Mg/Ml): 10.0
How Many Mls Were Removed From The 10 Mg/Ml (5ml) Vial When Preparing The Injectable Solution?: 0.8
Expiration Date For Kenalog (Optional): 02/2026
Total Volume (Ccs): 0.5
Medical Necessity Clause: This procedure was medically necessary because the lesions that were treated were:
Detail Level: Detailed

## 2024-10-23 ENCOUNTER — APPOINTMENT (RX ONLY)
Dept: URBAN - METROPOLITAN AREA CLINIC 185 | Facility: CLINIC | Age: 29
Setting detail: DERMATOLOGY
End: 2024-10-23

## 2024-10-23 DIAGNOSIS — L63.8 OTHER ALOPECIA AREATA: ICD-10-CM

## 2024-10-23 PROCEDURE — ? COUNSELING

## 2024-10-23 PROCEDURE — ? INTRALESIONAL KENALOG

## 2024-10-23 PROCEDURE — 11900 INJECT SKIN LESIONS </W 7: CPT

## 2024-10-23 ASSESSMENT — LOCATION DETAILED DESCRIPTION DERM: LOCATION DETAILED: MID-OCCIPITAL SCALP

## 2024-10-23 ASSESSMENT — LOCATION SIMPLE DESCRIPTION DERM: LOCATION SIMPLE: POSTERIOR SCALP

## 2024-10-23 ASSESSMENT — LOCATION ZONE DERM: LOCATION ZONE: SCALP

## 2024-10-23 NOTE — PROCEDURE: INTRALESIONAL KENALOG
How Many Mls Were Removed From The 40 Mg/Ml (5ml) Vial When Preparing The Injectable Solution?: 0
Include Z78.9 (Other Specified Conditions Influencing Health Status) As An Associated Diagnosis?: No
Validate Note Data When Using Inventory: Yes
Consent: The risks of atrophy were reviewed with the patient.
Which Kenalog Vial Was Used?: Kenalog 10 mg/ml (5 ml vial)
Lot # For Kenalog (Optional): [id number]
Administered By (Optional): ELLEN
Kenalog Preparation: Kenalog
Kenalog Type Of Vial: Multiple Dose
Concentration Of Kenalog Solution Injected (Mg/Ml): 10.0
How Many Mls Were Removed From The 10 Mg/Ml (5ml) Vial When Preparing The Injectable Solution?: 0.8
Treatment Number (Optional): 3
Expiration Date For Kenalog (Optional): 03/2026
Total Volume (Ccs): 0.5
Medical Necessity Clause: This procedure was medically necessary because the lesions that were treated were:
Detail Level: Detailed